# Patient Record
Sex: FEMALE | Race: BLACK OR AFRICAN AMERICAN | NOT HISPANIC OR LATINO | URBAN - METROPOLITAN AREA
[De-identification: names, ages, dates, MRNs, and addresses within clinical notes are randomized per-mention and may not be internally consistent; named-entity substitution may affect disease eponyms.]

---

## 2018-10-29 PROBLEM — Z00.00 ENCOUNTER FOR PREVENTIVE HEALTH EXAMINATION: Status: ACTIVE | Noted: 2018-10-29

## 2018-11-03 ENCOUNTER — OUTPATIENT (OUTPATIENT)
Dept: OUTPATIENT SERVICES | Facility: HOSPITAL | Age: 68
LOS: 1 days | End: 2018-11-03
Payer: MEDICARE

## 2018-11-03 ENCOUNTER — APPOINTMENT (OUTPATIENT)
Dept: NUCLEAR MEDICINE | Facility: IMAGING CENTER | Age: 68
End: 2018-11-03
Payer: MEDICARE

## 2018-11-03 DIAGNOSIS — Z00.8 ENCOUNTER FOR OTHER GENERAL EXAMINATION: ICD-10-CM

## 2018-11-03 PROCEDURE — A9500: CPT

## 2018-11-03 PROCEDURE — A9512: CPT

## 2018-11-03 PROCEDURE — 78072 PARATHYRD PLANAR W/SPECT&CT: CPT

## 2018-11-03 PROCEDURE — 78072 PARATHYRD PLANAR W/SPECT&CT: CPT | Mod: 26

## 2019-02-11 ENCOUNTER — APPOINTMENT (OUTPATIENT)
Dept: INTERNAL MEDICINE | Facility: CLINIC | Age: 69
End: 2019-02-11

## 2020-07-20 ENCOUNTER — APPOINTMENT (OUTPATIENT)
Dept: ORTHOPEDIC SURGERY | Facility: CLINIC | Age: 70
End: 2020-07-20
Payer: MEDICARE

## 2020-07-20 VITALS — BODY MASS INDEX: 30 KG/M2 | HEIGHT: 62 IN | WEIGHT: 163 LBS

## 2020-07-20 PROCEDURE — 99204 OFFICE O/P NEW MOD 45 MIN: CPT

## 2020-07-20 PROCEDURE — 73502 X-RAY EXAM HIP UNI 2-3 VIEWS: CPT | Mod: RT

## 2020-07-20 NOTE — PHYSICAL EXAM
[de-identified] : AP and lateral x-rays of the right hip, pelvis, and femur were ordered and taken in the office and demonstrate degenerative joint disease of the hip with joint space narrowing, osteophyte formation, and subchondral sclerosis.\par  [de-identified] : Patient is well nourished, well-developed, in no acute distress, with appropriate mood and affect. The patient is oriented to time, place, and person. Respirations are even and unlabored. Gait evaluation reveals a limp. There is no inguinal adenopathy. Examination of the contralateral hip shows normal range of motion, strength, no tenderness, and intact skin. The affected limb is well-perfused, shows a grossly normal motor and sensory examination. Examination of the hip shows no skin lesions. Hip motion is reduced and causes pain. FADIR is positive and ELBERT is positive. Stinchfield test is positive. Leg lengths are approximately short on the right by 1 cm. Both hips are stable and muscle strength is normal. Pedal pulses are palpable.

## 2020-07-20 NOTE — HISTORY OF PRESENT ILLNESS
[de-identified] : This is very nice 70-year-old female experiencing Right hip pain, which is severe in intensity. patient states pain is chronic for years.  The pain substantially limits activities of daily living. Walking tolerance is reduced. Patient uses tylenol for relief as needed. The patient has no tried any treatments to this point including NSAIDs, activity modification, use of an assistive device such as a cane or walker, or physical therapy. The patient does not use a neoprene sleeve knee brace. The patient denies any radiation of the pain to the feet and it is not associated with numbness, tingling, or weakness.\par

## 2020-07-20 NOTE — DISCUSSION/SUMMARY
[de-identified] : This patient is right hip osteoarthritis.  The patient is not an appropriate candidate for surgical intervention at this time. An extensive discussion was conducted on the natural history of the disease and the variety of surgical and non-surgical options available to the patient including, but not limited to non-steroidal anti-inflammatory medications, steroid injections, physical therapy, maintenance of ideal body weight, and reduction of activity.  I gave him a prescription for right hip intra-articular cortisone injection image guided.  She can take over-the-counter NSAIDs for pain.  She will do home exercise program.  The patient will schedule an appointment as needed.\par

## 2020-08-14 ENCOUNTER — APPOINTMENT (OUTPATIENT)
Dept: ORTHOPEDIC SURGERY | Facility: CLINIC | Age: 70
End: 2020-08-14

## 2020-08-24 ENCOUNTER — APPOINTMENT (OUTPATIENT)
Dept: ORTHOPEDIC SURGERY | Facility: CLINIC | Age: 70
End: 2020-08-24
Payer: MEDICARE

## 2020-08-24 VITALS — BODY MASS INDEX: 32.59 KG/M2 | HEIGHT: 60 IN | WEIGHT: 166 LBS

## 2020-08-24 PROCEDURE — 99214 OFFICE O/P EST MOD 30 MIN: CPT

## 2020-08-24 RX ORDER — AMLODIPINE BESYLATE 5 MG/1
5 TABLET ORAL
Refills: 0 | Status: ACTIVE | COMMUNITY

## 2020-08-24 NOTE — HISTORY OF PRESENT ILLNESS
[de-identified] : This is very nice 70-year-old female here for re-evaluation of Rt hip pain.  I have previously diagnosed her with severe right hip osteoarthritis.  Severe in intensity.  Patient states pain is chronic for years.  The pain substantially limits activities of daily living. Walking tolerance is reduced. Patient uses tylenol and Advil for relief as needed but they only help minimally. She does not use an assistive device.  She has not tried physical therapy but does a home exercise program.  We previously discussed a right hip intra-articular cortisone injection for pain relief but she decided against this favoring just proceeding with surgery and not deferring surgical treatment as definitive treatment.  The patient denies any radiation of the pain to the feet and it is not associated with numbness, tingling, or weakness.\par

## 2020-08-24 NOTE — DISCUSSION/SUMMARY
[de-identified] : This patient has severe right hip osteoarthritis.  She has failed a course of conservative management and would like to proceed with a direct anterior approach right total hip arthroplasty.\par \par The patient is an appropriate candidate for consideration of right total hip replacement. An extensive discussion was conducted of the natural history of the disease and the variety of surgical and non-surgical treatment options available to the patient. A risk/benefit analysis was discussed with the patient reviewing the advantages and disadvantages of surgical intervention at this time. Both the level and length of the patient's pain have made additional conservative treatment measures consisting of NSAIDs, physical therapy, and/or corticosteroids contraindicated. A full explanation was given of the nature and the purpose of the procedure and anesthesia, its benefits, possible alternative methods of diagnosis or treatment, the risks involved, the possibility of complications, the foreseeable consequences of the procedure and the possible results of the non-treatment. I reviewed the plan of care as well as used a model of a total hip implant equivalent to the one that will be used for their total hip joint replacement. The ability to secure the implant utilizing cement or cementless (press-fit) was discussed with the patient. The patient agrees with the plan of care, as well as the use of implants for their total hip replacement. \par \par No guarantee or assurance was made as to the results that may be obtained. Specifically, the risks were identified to include, but are not limited to the following: Infection, phlebitis, pulmonary embolism, death, paralysis, dislocation, pain, stiffness, instability, limp, weakness, breakage, leg-length inequality, uncontrolled bleeding, nerve injury, blood vessel injury, pressure sores, anesthetic risks, delayed healing of wound and bone, and wear and loosening. Further discussion was undertaken with the patient about the details of surgical preparation, treatment, and postoperative rehabilitation including medical clearance, autotransfusion, the hospital course, and the postoperative rehabilitation involved. All in all, I feel that this patient is a good candidate for surgical reconstruction.\par \par The patient and I discussed the current SARS-CoV-2 (COVID-19) pandemic which has affected our local hospitals. We discussed that our hospitals treat patients with COVID-19. All efforts will be made to avoid cohorting the patient with diagnosed or suspected COVID-19 patient. They also understand that we will screen them 24-48 hours prior to surgery. Despite our best efforts, there is a potential risk for iatrogenic transmission of COVID-19 to the patient during the perioperative period. Lorrie COVID-19 during the perioperative period may increase the patient´s risks of an adverse outcome including postoperative pneumonia, difficulty breathing, requirement for a breathing tube (general endotracheal intubation), and death. The patient is understanding of this risk, and is willing to proceed with surgery at this time.

## 2020-08-24 NOTE — PHYSICAL EXAM
[de-identified] : Patient is well nourished, well-developed, in no acute distress, with appropriate mood and affect. The patient is oriented to time, place, and person. Respirations are even and unlabored. Gait evaluation reveals a limp. There is no inguinal adenopathy. Examination of the contralateral hip shows normal range of motion, strength, no tenderness, and intact skin. The affected limb is well-perfused, shows a grossly normal motor and sensory examination. Examination of the hip shows no skin lesions. Hip motion is reduced and causes pain. FADIR is positive and ELBERT is positive. Stinchfield test is positive. Leg lengths are approximately short on the right by 1 cm. Both hips are stable and muscle strength is normal. Pedal pulses are palpable. [de-identified] : AP and lateral x-rays of the right hip, pelvis, and femur were reviewed from the previous visit and demonstrate severe degenerative joint disease of the hip with joint space narrowing, osteophyte formation, and subchondral sclerosis.\par

## 2020-09-25 ENCOUNTER — OUTPATIENT (OUTPATIENT)
Dept: OUTPATIENT SERVICES | Facility: HOSPITAL | Age: 70
LOS: 1 days | Discharge: HOME | End: 2020-09-25
Payer: MEDICARE

## 2020-09-25 DIAGNOSIS — M16.11 UNILATERAL PRIMARY OSTEOARTHRITIS, RIGHT HIP: ICD-10-CM

## 2020-09-25 DIAGNOSIS — Z98.891 HISTORY OF UTERINE SCAR FROM PREVIOUS SURGERY: Chronic | ICD-10-CM

## 2020-09-25 DIAGNOSIS — Z01.818 ENCOUNTER FOR OTHER PREPROCEDURAL EXAMINATION: ICD-10-CM

## 2020-09-25 DIAGNOSIS — E78.5 HYPERLIPIDEMIA, UNSPECIFIED: ICD-10-CM

## 2020-09-25 DIAGNOSIS — Z98.890 OTHER SPECIFIED POSTPROCEDURAL STATES: Chronic | ICD-10-CM

## 2020-09-25 DIAGNOSIS — I10 ESSENTIAL (PRIMARY) HYPERTENSION: ICD-10-CM

## 2020-09-25 DIAGNOSIS — C50.919 MALIGNANT NEOPLASM OF UNSPECIFIED SITE OF UNSPECIFIED FEMALE BREAST: ICD-10-CM

## 2020-09-25 DIAGNOSIS — Z86.73 PERSONAL HISTORY OF TRANSIENT ISCHEMIC ATTACK (TIA), AND CEREBRAL INFARCTION WITHOUT RESIDUAL DEFICITS: ICD-10-CM

## 2020-09-25 LAB
ANION GAP SERPL CALC-SCNC: 6 MMOL/L — SIGNIFICANT CHANGE UP (ref 5–17)
APTT BLD: 30.7 SEC — SIGNIFICANT CHANGE UP (ref 27.5–35.5)
BLD GP AB SCN SERPL QL: SIGNIFICANT CHANGE UP
BUN SERPL-MCNC: 26 MG/DL — HIGH (ref 7–23)
CALCIUM SERPL-MCNC: 9.5 MG/DL — SIGNIFICANT CHANGE UP (ref 8.5–10.1)
CHLORIDE SERPL-SCNC: 105 MMOL/L — SIGNIFICANT CHANGE UP (ref 96–108)
CO2 SERPL-SCNC: 29 MMOL/L — SIGNIFICANT CHANGE UP (ref 22–31)
CREAT SERPL-MCNC: 0.9 MG/DL — SIGNIFICANT CHANGE UP (ref 0.5–1.3)
GLUCOSE SERPL-MCNC: 80 MG/DL — SIGNIFICANT CHANGE UP (ref 70–99)
HCT VFR BLD CALC: 42.5 % — SIGNIFICANT CHANGE UP (ref 34.5–45)
HGB BLD-MCNC: 13.9 G/DL — SIGNIFICANT CHANGE UP (ref 11.5–15.5)
INR BLD: 1.05 RATIO — SIGNIFICANT CHANGE UP (ref 0.88–1.16)
MCHC RBC-ENTMCNC: 30.2 PG — SIGNIFICANT CHANGE UP (ref 27–34)
MCHC RBC-ENTMCNC: 32.7 GM/DL — SIGNIFICANT CHANGE UP (ref 32–36)
MCV RBC AUTO: 92.2 FL — SIGNIFICANT CHANGE UP (ref 80–100)
NRBC # BLD: 0 /100 WBCS — SIGNIFICANT CHANGE UP (ref 0–0)
PLATELET # BLD AUTO: 268 K/UL — SIGNIFICANT CHANGE UP (ref 150–400)
POTASSIUM SERPL-MCNC: 3.5 MMOL/L — SIGNIFICANT CHANGE UP (ref 3.5–5.3)
POTASSIUM SERPL-SCNC: 3.5 MMOL/L — SIGNIFICANT CHANGE UP (ref 3.5–5.3)
PROTHROM AB SERPL-ACNC: 12.2 SEC — SIGNIFICANT CHANGE UP (ref 10.6–13.6)
RBC # BLD: 4.61 M/UL — SIGNIFICANT CHANGE UP (ref 3.8–5.2)
RBC # FLD: 13.2 % — SIGNIFICANT CHANGE UP (ref 10.3–14.5)
SODIUM SERPL-SCNC: 140 MMOL/L — SIGNIFICANT CHANGE UP (ref 135–145)
WBC # BLD: 4.14 K/UL — SIGNIFICANT CHANGE UP (ref 3.8–10.5)
WBC # FLD AUTO: 4.14 K/UL — SIGNIFICANT CHANGE UP (ref 3.8–10.5)

## 2020-09-25 PROCEDURE — 93010 ELECTROCARDIOGRAM REPORT: CPT

## 2020-09-25 RX ORDER — SODIUM CHLORIDE 9 MG/ML
3 INJECTION INTRAMUSCULAR; INTRAVENOUS; SUBCUTANEOUS EVERY 8 HOURS
Refills: 0 | Status: DISCONTINUED | OUTPATIENT
Start: 2020-10-09 | End: 2020-10-11

## 2020-09-25 NOTE — H&P PST ADULT - HISTORY OF PRESENT ILLNESS
70F pmh htn, hld, gerd, osteopenia breast cancer (treated with Radiation)----- c/o right hip pain 2/2 unilateral primary osteoarthritis here for PST for scheduled Right total hip arthroplasty  Looking forward to ---  This patient denies any fever, cough, sob, flu like symptoms or travel outside of the [place] in the past 30 days 70F pmh htn, hld, gerd, osteopenia breast cancer (treated with Radiation)----- c/o right hip pain 2/2 unilateral primary osteoarthritis here for PST for scheduled Right total hip arthroplasty  Looking forward playing with grandchildren   This patient denies any fever, cough, sob, flu like symptoms or travel outside of the [place] in the past 30 days 70F pmh htn, hld, gerd, osteopenia breast cancer (treated with surgery and Radiation), tia,  c/o right hip pain 2/2 unilateral primary osteoarthritis here for PST for scheduled Right total hip arthroplasty  Looking forward playing with grandchildren   This patient denies any fever, cough, sob, flu like symptoms or travel outside of the [place] in the past 30 days

## 2020-09-25 NOTE — H&P PST ADULT - NSICDXPASTSURGICALHX_GEN_ALL_CORE_FT
PAST SURGICAL HISTORY:  H/O:      S/P bilateral breast reduction     S/P lumpectomy, left breast b/l

## 2020-09-25 NOTE — H&P PST ADULT - NSICDXPROBLEM_GEN_ALL_CORE_FT
PROBLEM DIAGNOSES  Problem: Unilateral primary osteoarthritis, right hip  Assessment and Plan: Right total hip arthroplasty direct anterior approach  labs - cbc,pt/ptt,bmp,t&s,nose cx,ekg  M/C required  Dental clearance required  preop 3 day hibiclens instruction reviewed and given .instructed on if  nose cx positive use mupuricin 5 days and checklist given  take routine meds DOS with sips of water. avoid NSAID and OTC supplements. verbalized understanding  information on proper nutrition , increase protein and better food choices provided in packet   Ensure clear given  Patient to scheduled COVID swab appointment on 10-6-2020    Problem: Breast cancer  Assessment and Plan: treated with surgery and Radiation    Problem: HTN (hypertension)  Assessment and Plan: Continue current regimen and medications.     Problem: HLD (hyperlipidemia)  Assessment and Plan: Continue current regimen and medications.     Problem: History of TIAs  Assessment and Plan: Continue current regimen

## 2020-09-25 NOTE — PHYSICAL THERAPY INITIAL EVALUATION ADULT - PERTINENT HX OF CURRENT PROBLEM, REHAB EVAL
Patient attends pre-op testing today following consult c Dr. Toscano due to chronic pain to right hip. Elective R ANTHONY is now scheduled in this facility for 10/9/2020.

## 2020-09-25 NOTE — H&P PST ADULT - ASSESSMENT
CAPRINI SCORE    AGE RELATED RISK FACTORS                                                       MOBILITY RELATED FACTORS  [ ] Age 41-60 years                                            (1 Point)                  [ ] Bed rest                                                        (1 Point)  [ ] Age: 61-74 years                                           (2 Points)                [ ] Plaster cast                                                   (2 Points)  [ ] Age= 75 years                                              (3 Points)                 [ ] Bed bound for more than 72 hours                   (2 Points)    DISEASE RELATED RISK FACTORS                                               GENDER SPECIFIC FACTORS  [ ] Edema in the lower extremities                       (1 Point)                  [ ] Pregnancy                                                     (1 Point)  [ ] Varicose veins                                               (1 Point)                  [ ] Post-partum < 6 weeks                                   (1 Point)             [ ] BMI > 25 Kg/m2                                            (1 Point)                  [ ] Hormonal therapy  or oral contraception            (1 Point)                 [ ] Sepsis (in the previous month)                        (1 Point)                  [ ] History of pregnancy complications  [ ] Pneumonia or serious lung disease                                               [ ] Unexplained or recurrent                       (1 Point)           (in the previous month)                               (1 Point)  [ ] Abnormal pulmonary function test                     (1 Point)                 SURGERY RELATED RISK FACTORS  [ ] Acute myocardial infarction                              (1 Point)                 [ ]  Section                                            (1 Point)  [ ] Congestive heart failure (in the previous month)  (1 Point)                 [ ] Minor surgery                                                 (1 Point)   [ ] Inflammatory bowel disease                             (1 Point)                 [ ] Arthroscopic surgery                                        (2 Points)  [ ] Central venous access                                    (2 Points)                [ ] General surgery lasting more than 45 minutes   (2 Points)       [ ] Stroke (in the previous month)                          (5 Points)               [ ] Elective arthroplasty                                        (5 Points)                                                                                                                                               HEMATOLOGY RELATED FACTORS                                                 TRAUMA RELATED RISK FACTORS  [ ] Prior episodes of VTE                                     (3 Points)                 [ ] Fracture of the hip, pelvis, or leg                       (5 Points)  [ ] Positive family history for VTE                         (3 Points)                 [ ] Acute spinal cord injury (in the previous month)  (5 Points)  [ ] Prothrombin 71537 A                                      (3 Points)                 [ ] Paralysis  (less than 1 month)                          (5 Points)  [ ] Factor V Leiden                                             (3 Points)                 [ ] Multiple Trauma within 1 month                         (5 Points)  [ ] Lupus anticoagulants                                     (3 Points)                                                           [ ] Anticardiolipin antibodies                                (3 Points)                                                       [ ] High homocysteine in the blood                      (3 Points)                                             [ ] Other congenital or acquired thrombophilia       (3 Points)                                                [ ] Heparin induced thrombocytopenia                  (3 Points)                                          Total Score [          ] 70F pmh htn, hld, gerd, osteopenia breast cancer (treated with surgery and Radiation), tia,  c/o right hip pain 2/2 unilateral primary osteoarthritis here for PST for scheduled Right total hip arthroplasty  CAPRINI SCORE    AGE RELATED RISK FACTORS                                                       MOBILITY RELATED FACTORS  [ ] Age 41-60 years                                            (1 Point)                  [ ] Bed rest                                                        (1 Point)  [ x] Age: 61-74 years                                           (2 Points)                [ ] Plaster cast                                                   (2 Points)  [ ] Age= 75 years                                              (3 Points)                 [ ] Bed bound for more than 72 hours                   (2 Points)    DISEASE RELATED RISK FACTORS                                               GENDER SPECIFIC FACTORS  [x ] Edema in the lower extremities                       (1 Point)                  [ ] Pregnancy                                                     (1 Point)  [ ] Varicose veins                                               (1 Point)                  [ ] Post-partum < 6 weeks                                   (1 Point)             [x ] BMI > 25 Kg/m2                                            (1 Point)                  [ ] Hormonal therapy  or oral contraception            (1 Point)                 [ ] Sepsis (in the previous month)                        (1 Point)                  [ ] History of pregnancy complications  [ ] Pneumonia or serious lung disease                                               [ ] Unexplained or recurrent                       (1 Point)           (in the previous month)                               (1 Point)  [ ] Abnormal pulmonary function test                     (1 Point)                 SURGERY RELATED RISK FACTORS  [ ] Acute myocardial infarction                              (1 Point)                 [ ]  Section                                            (1 Point)  [ ] Congestive heart failure (in the previous month)  (1 Point)                 [ ] Minor surgery                                                 (1 Point)   [ ] Inflammatory bowel disease                             (1 Point)                 [ ] Arthroscopic surgery                                        (2 Points)  [ ] Central venous access                                    (2 Points)                [ ] General surgery lasting more than 45 minutes   (2 Points)       [ ] Stroke (in the previous month)                          (5 Points)               [ x] Elective arthroplasty                                        (5 Points)                                                                                                                                               HEMATOLOGY RELATED FACTORS                                                 TRAUMA RELATED RISK FACTORS  [ ] Prior episodes of VTE                                     (3 Points)                 [ ] Fracture of the hip, pelvis, or leg                       (5 Points)  [ ] Positive family history for VTE                         (3 Points)                 [ ] Acute spinal cord injury (in the previous month)  (5 Points)  [ ] Prothrombin 21898 A                                      (3 Points)                 [ ] Paralysis  (less than 1 month)                          (5 Points)  [ ] Factor V Leiden                                             (3 Points)                 [ ] Multiple Trauma within 1 month                         (5 Points)  [ ] Lupus anticoagulants                                     (3 Points)                                                           [ ] Anticardiolipin antibodies                                (3 Points)                                                       [ ] High homocysteine in the blood                      (3 Points)                                             [ ] Other congenital or acquired thrombophilia       (3 Points)                                                [ ] Heparin induced thrombocytopenia                  (3 Points)                                          Total Score [       9   ]

## 2020-09-25 NOTE — PHYSICAL THERAPY INITIAL EVALUATION ADULT - ADDITIONAL COMMENTS
Pt lives with her  (whom can provide assist upon D/C home) in a private home, 2 entry steps (no rail), 1 level inside home.  Pt has a walk-in shower stall with a fixed shower head, standard toilet seat eight, & no grab bar. Pt states she is currently independent with all functional mobility including community ambulation without device. Pt states she is independent with ADL's as well. Pt reports daily 7/10 pain & states it is worse with any activity. Pt is right hand dominant, wears eye glasses, drives, & is retired.  Pt endorses taking narcotics for pain management. Goal of therapy: manage pain & improve functional mobility.

## 2020-09-25 NOTE — OCCUPATIONAL THERAPY INITIAL EVALUATION ADULT - ADDITIONAL COMMENTS
Patient lives with  (Who can assist and also patients sister) in a private house with 2 steps and no rails. Once inside, the patient main bedroom and bathroom is on that floor when entering. The bathroom has a walk in shower, fixed shower head, standard toilet seat and no grab bars. The patient owns a shower bench. The patient reports that a 3/1 commode can fit over the toilet at home. The patient ambulates with no device and does not own any device for ambulation. The patient daily pain is a 7 at rest and a 8-10 with movement. The patient manages the pain with rest and with Tylenol. The patient has no recent outpatient PT, no recent falls, no buckling of the knees reported. The patient wears eye glasses for reading, R handed, drives and has no hearing impairments.

## 2020-09-25 NOTE — PHYSICAL THERAPY INITIAL EVALUATION ADULT - RANGE OF MOTION EXAMINATION, REHAB EVAL
except right hip limited due to pain./bilateral lower extremity was ROM was WNL (within normal limits)/bilateral upper extremity ROM was WNL (within normal limits)/deficits as listed below

## 2020-09-25 NOTE — H&P PST ADULT - NSICDXPASTMEDICALHX_GEN_ALL_CORE_FT
PAST MEDICAL HISTORY:  Breast cancer, left treated with Radiation 2015    GERD (gastroesophageal reflux disease)     H/O osteopenia     HLD (hyperlipidemia)     HTN (hypertension)      PAST MEDICAL HISTORY:  Breast cancer, left treated with Radiation 2015    GERD (gastroesophageal reflux disease)     H/O osteopenia     HLD (hyperlipidemia)     HTN (hypertension)     TIA (transient ischemic attack)

## 2020-09-26 LAB
A1C WITH ESTIMATED AVERAGE GLUCOSE RESULT: 5.4 % — SIGNIFICANT CHANGE UP (ref 4–5.6)
ESTIMATED AVERAGE GLUCOSE: 108 MG/DL — SIGNIFICANT CHANGE UP (ref 68–114)
MRSA PCR RESULT.: SIGNIFICANT CHANGE UP
S AUREUS DNA NOSE QL NAA+PROBE: SIGNIFICANT CHANGE UP

## 2020-09-29 PROBLEM — I10 ESSENTIAL (PRIMARY) HYPERTENSION: Chronic | Status: ACTIVE | Noted: 2020-09-25

## 2020-09-29 PROBLEM — K21.9 GASTRO-ESOPHAGEAL REFLUX DISEASE WITHOUT ESOPHAGITIS: Chronic | Status: ACTIVE | Noted: 2020-09-25

## 2020-09-29 PROBLEM — Z87.39 PERSONAL HISTORY OF OTHER DISEASES OF THE MUSCULOSKELETAL SYSTEM AND CONNECTIVE TISSUE: Chronic | Status: ACTIVE | Noted: 2020-09-25

## 2020-09-29 PROBLEM — C50.912 MALIGNANT NEOPLASM OF UNSPECIFIED SITE OF LEFT FEMALE BREAST: Chronic | Status: ACTIVE | Noted: 2020-09-25

## 2020-09-29 PROBLEM — E78.5 HYPERLIPIDEMIA, UNSPECIFIED: Chronic | Status: ACTIVE | Noted: 2020-09-25

## 2020-10-06 ENCOUNTER — APPOINTMENT (OUTPATIENT)
Dept: DISASTER EMERGENCY | Facility: CLINIC | Age: 70
End: 2020-10-06

## 2020-10-07 LAB — SARS-COV-2 N GENE NPH QL NAA+PROBE: NOT DETECTED

## 2020-10-08 ENCOUNTER — TRANSCRIPTION ENCOUNTER (OUTPATIENT)
Age: 70
End: 2020-10-08

## 2020-10-09 ENCOUNTER — INPATIENT (INPATIENT)
Facility: HOSPITAL | Age: 70
LOS: 1 days | Discharge: HOME HEALTH | End: 2020-10-11
Attending: ORTHOPAEDIC SURGERY | Admitting: ORTHOPAEDIC SURGERY
Payer: MEDICARE

## 2020-10-09 ENCOUNTER — RESULT REVIEW (OUTPATIENT)
Age: 70
End: 2020-10-09

## 2020-10-09 ENCOUNTER — TRANSCRIPTION ENCOUNTER (OUTPATIENT)
Age: 70
End: 2020-10-09

## 2020-10-09 ENCOUNTER — APPOINTMENT (OUTPATIENT)
Dept: ORTHOPEDIC SURGERY | Facility: HOSPITAL | Age: 70
End: 2020-10-09

## 2020-10-09 VITALS
WEIGHT: 160.06 LBS | DIASTOLIC BLOOD PRESSURE: 61 MMHG | HEIGHT: 60 IN | SYSTOLIC BLOOD PRESSURE: 124 MMHG | OXYGEN SATURATION: 99 % | TEMPERATURE: 98 F | HEART RATE: 57 BPM | RESPIRATION RATE: 18 BRPM

## 2020-10-09 DIAGNOSIS — Z98.890 OTHER SPECIFIED POSTPROCEDURAL STATES: Chronic | ICD-10-CM

## 2020-10-09 DIAGNOSIS — Z98.891 HISTORY OF UTERINE SCAR FROM PREVIOUS SURGERY: Chronic | ICD-10-CM

## 2020-10-09 LAB — BLD GP AB SCN SERPL QL: SIGNIFICANT CHANGE UP

## 2020-10-09 PROCEDURE — 73502 X-RAY EXAM HIP UNI 2-3 VIEWS: CPT | Mod: 26

## 2020-10-09 PROCEDURE — 27130 TOTAL HIP ARTHROPLASTY: CPT | Mod: RT

## 2020-10-09 PROCEDURE — 88305 TISSUE EXAM BY PATHOLOGIST: CPT | Mod: 26

## 2020-10-09 PROCEDURE — 88311 DECALCIFY TISSUE: CPT | Mod: 26

## 2020-10-09 RX ORDER — ONDANSETRON 8 MG/1
4 TABLET, FILM COATED ORAL EVERY 6 HOURS
Refills: 0 | Status: DISCONTINUED | OUTPATIENT
Start: 2020-10-09 | End: 2020-10-11

## 2020-10-09 RX ORDER — ACETAMINOPHEN 500 MG
1000 TABLET ORAL ONCE
Refills: 0 | Status: COMPLETED | OUTPATIENT
Start: 2020-10-09 | End: 2020-10-09

## 2020-10-09 RX ORDER — PANTOPRAZOLE SODIUM 20 MG/1
1 TABLET, DELAYED RELEASE ORAL
Qty: 14 | Refills: 0
Start: 2020-10-09 | End: 2020-10-22

## 2020-10-09 RX ORDER — ASPIRIN/CALCIUM CARB/MAGNESIUM 324 MG
81 TABLET ORAL
Refills: 0 | Status: DISCONTINUED | OUTPATIENT
Start: 2020-10-09 | End: 2020-10-11

## 2020-10-09 RX ORDER — PANTOPRAZOLE SODIUM 20 MG/1
40 TABLET, DELAYED RELEASE ORAL
Refills: 0 | Status: DISCONTINUED | OUTPATIENT
Start: 2020-10-09 | End: 2020-10-11

## 2020-10-09 RX ORDER — SENNA PLUS 8.6 MG/1
2 TABLET ORAL AT BEDTIME
Refills: 0 | Status: DISCONTINUED | OUTPATIENT
Start: 2020-10-09 | End: 2020-10-11

## 2020-10-09 RX ORDER — SODIUM CHLORIDE 9 MG/ML
500 INJECTION, SOLUTION INTRAVENOUS ONCE
Refills: 0 | Status: COMPLETED | OUTPATIENT
Start: 2020-10-09 | End: 2020-10-10

## 2020-10-09 RX ORDER — ASPIRIN/CALCIUM CARB/MAGNESIUM 324 MG
1 TABLET ORAL
Qty: 60 | Refills: 0
Start: 2020-10-09 | End: 2020-11-07

## 2020-10-09 RX ORDER — SODIUM CHLORIDE 9 MG/ML
1000 INJECTION, SOLUTION INTRAVENOUS
Refills: 0 | Status: DISCONTINUED | OUTPATIENT
Start: 2020-10-09 | End: 2020-10-09

## 2020-10-09 RX ORDER — CELECOXIB 200 MG/1
200 CAPSULE ORAL
Refills: 0 | Status: DISCONTINUED | OUTPATIENT
Start: 2020-10-09 | End: 2020-10-11

## 2020-10-09 RX ORDER — HYDROMORPHONE HYDROCHLORIDE 2 MG/ML
0.5 INJECTION INTRAMUSCULAR; INTRAVENOUS; SUBCUTANEOUS
Refills: 0 | Status: DISCONTINUED | OUTPATIENT
Start: 2020-10-09 | End: 2020-10-09

## 2020-10-09 RX ORDER — AMLODIPINE BESYLATE 2.5 MG/1
5 TABLET ORAL DAILY
Refills: 0 | Status: DISCONTINUED | OUTPATIENT
Start: 2020-10-09 | End: 2020-10-11

## 2020-10-09 RX ORDER — ACETAMINOPHEN 500 MG
975 TABLET ORAL EVERY 6 HOURS
Refills: 0 | Status: DISCONTINUED | OUTPATIENT
Start: 2020-10-10 | End: 2020-10-11

## 2020-10-09 RX ORDER — OXYCODONE HYDROCHLORIDE 5 MG/1
10 TABLET ORAL EVERY 4 HOURS
Refills: 0 | Status: DISCONTINUED | OUTPATIENT
Start: 2020-10-09 | End: 2020-10-11

## 2020-10-09 RX ORDER — SODIUM CHLORIDE 9 MG/ML
500 INJECTION, SOLUTION INTRAVENOUS ONCE
Refills: 0 | Status: COMPLETED | OUTPATIENT
Start: 2020-10-09 | End: 2020-10-09

## 2020-10-09 RX ORDER — ACETAMINOPHEN 500 MG
650 TABLET ORAL ONCE
Refills: 0 | Status: COMPLETED | OUTPATIENT
Start: 2020-10-09 | End: 2020-10-09

## 2020-10-09 RX ORDER — TRAMADOL HYDROCHLORIDE 50 MG/1
1 TABLET ORAL
Qty: 28 | Refills: 0
Start: 2020-10-09 | End: 2020-10-15

## 2020-10-09 RX ORDER — INFLUENZA VIRUS VACCINE 15; 15; 15; 15 UG/.5ML; UG/.5ML; UG/.5ML; UG/.5ML
0.5 SUSPENSION INTRAMUSCULAR ONCE
Refills: 0 | Status: COMPLETED | OUTPATIENT
Start: 2020-10-09 | End: 2020-10-09

## 2020-10-09 RX ORDER — OXYCODONE HYDROCHLORIDE 5 MG/1
5 TABLET ORAL EVERY 4 HOURS
Refills: 0 | Status: DISCONTINUED | OUTPATIENT
Start: 2020-10-09 | End: 2020-10-11

## 2020-10-09 RX ORDER — KETOROLAC TROMETHAMINE 30 MG/ML
30 SYRINGE (ML) INJECTION EVERY 6 HOURS
Refills: 0 | Status: COMPLETED | OUTPATIENT
Start: 2020-10-09 | End: 2020-10-11

## 2020-10-09 RX ORDER — POLYETHYLENE GLYCOL 3350 17 G/17G
17 POWDER, FOR SOLUTION ORAL DAILY
Refills: 0 | Status: DISCONTINUED | OUTPATIENT
Start: 2020-10-09 | End: 2020-10-11

## 2020-10-09 RX ORDER — ZOLPIDEM TARTRATE 10 MG/1
5 TABLET ORAL AT BEDTIME
Refills: 0 | Status: DISCONTINUED | OUTPATIENT
Start: 2020-10-09 | End: 2020-10-11

## 2020-10-09 RX ORDER — ACETAMINOPHEN 500 MG
2 TABLET ORAL
Qty: 30 | Refills: 0
Start: 2020-10-09 | End: 2020-10-13

## 2020-10-09 RX ORDER — CELECOXIB 200 MG/1
200 CAPSULE ORAL ONCE
Refills: 0 | Status: COMPLETED | OUTPATIENT
Start: 2020-10-09 | End: 2020-10-09

## 2020-10-09 RX ORDER — OXYCODONE HYDROCHLORIDE 5 MG/1
1 TABLET ORAL
Qty: 42 | Refills: 0
Start: 2020-10-09 | End: 2020-10-15

## 2020-10-09 RX ORDER — TRAMADOL HYDROCHLORIDE 50 MG/1
50 TABLET ORAL EVERY 6 HOURS
Refills: 0 | Status: DISCONTINUED | OUTPATIENT
Start: 2020-10-09 | End: 2020-10-11

## 2020-10-09 RX ORDER — DOCUSATE SODIUM 100 MG
1 CAPSULE ORAL
Qty: 60 | Refills: 0
Start: 2020-10-09 | End: 2020-11-07

## 2020-10-09 RX ORDER — CEFAZOLIN SODIUM 1 G
2000 VIAL (EA) INJECTION EVERY 8 HOURS
Refills: 0 | Status: COMPLETED | OUTPATIENT
Start: 2020-10-09 | End: 2020-10-10

## 2020-10-09 RX ORDER — DEXAMETHASONE 0.5 MG/5ML
6 ELIXIR ORAL ONCE
Refills: 0 | Status: COMPLETED | OUTPATIENT
Start: 2020-10-10 | End: 2020-10-10

## 2020-10-09 RX ORDER — ACETAMINOPHEN 500 MG
1000 TABLET ORAL ONCE
Refills: 0 | Status: DISCONTINUED | OUTPATIENT
Start: 2020-10-09 | End: 2020-10-11

## 2020-10-09 RX ADMIN — SODIUM CHLORIDE 500 MILLILITER(S): 9 INJECTION, SOLUTION INTRAVENOUS at 16:22

## 2020-10-09 RX ADMIN — OXYCODONE HYDROCHLORIDE 10 MILLIGRAM(S): 5 TABLET ORAL at 20:41

## 2020-10-09 RX ADMIN — Medication 100 MILLIGRAM(S): at 21:35

## 2020-10-09 RX ADMIN — Medication 1000 MILLIGRAM(S): at 23:30

## 2020-10-09 RX ADMIN — Medication 400 MILLIGRAM(S): at 16:23

## 2020-10-09 RX ADMIN — Medication 30 MILLIGRAM(S): at 23:14

## 2020-10-09 RX ADMIN — Medication 650 MILLIGRAM(S): at 12:22

## 2020-10-09 RX ADMIN — Medication 1000 MILLIGRAM(S): at 16:30

## 2020-10-09 RX ADMIN — SODIUM CHLORIDE 3 MILLILITER(S): 9 INJECTION INTRAMUSCULAR; INTRAVENOUS; SUBCUTANEOUS at 12:08

## 2020-10-09 RX ADMIN — Medication 400 MILLIGRAM(S): at 23:14

## 2020-10-09 RX ADMIN — Medication 81 MILLIGRAM(S): at 19:41

## 2020-10-09 RX ADMIN — SODIUM CHLORIDE 3 MILLILITER(S): 9 INJECTION INTRAMUSCULAR; INTRAVENOUS; SUBCUTANEOUS at 21:19

## 2020-10-09 RX ADMIN — SENNA PLUS 2 TABLET(S): 8.6 TABLET ORAL at 21:35

## 2020-10-09 RX ADMIN — SODIUM CHLORIDE 75 MILLILITER(S): 9 INJECTION, SOLUTION INTRAVENOUS at 17:03

## 2020-10-09 RX ADMIN — CELECOXIB 200 MILLIGRAM(S): 200 CAPSULE ORAL at 12:22

## 2020-10-09 RX ADMIN — Medication 30 MILLILITER(S): at 19:41

## 2020-10-09 RX ADMIN — ZOLPIDEM TARTRATE 5 MILLIGRAM(S): 10 TABLET ORAL at 23:14

## 2020-10-09 RX ADMIN — Medication 30 MILLIGRAM(S): at 23:30

## 2020-10-09 RX ADMIN — OXYCODONE HYDROCHLORIDE 10 MILLIGRAM(S): 5 TABLET ORAL at 19:41

## 2020-10-09 RX ADMIN — SODIUM CHLORIDE 500 MILLILITER(S): 9 INJECTION, SOLUTION INTRAVENOUS at 19:07

## 2020-10-09 NOTE — DISCHARGE NOTE PROVIDER - CARE PROVIDER_API CALL
Cole Toscano)  Orthopedics  [address], [address]  [place] [address]  Phone: [phone]  Fax: [phone]  Follow Up Time:

## 2020-10-09 NOTE — ASU PREOP CHECKLIST - HEART RATE (BEATS/MIN)
Subjective   Gemma Coronel is a 2 y.o. female.       History of Present Illness     Child is status post bilateral tube insertion.  Seems to be hearing better.  Mother states she is getting ready to start speech therapy.  No otorrhea    The following portions of the patient's history were reviewed and updated as appropriate: allergies, current medications, past family history, past medical history, past social history, past surgical history and problem list.      Review of Systems        Objective   Physical Exam  Ears: Tubes in place and patent bilaterally with no discharge    Audiogram is obtained and reviewed and shows normal hearing in sound field with tympanograms consistent with patent tubes      Assessment/Plan   Gemma was seen today for post-op.    Diagnoses and all orders for this visit:    Aftercare following surgery of a sense organ      Plan: Return in 4 months call for problems   57

## 2020-10-09 NOTE — DISCHARGE NOTE PROVIDER - NSDCCPCAREPLAN_GEN_ALL_CORE_FT
PRINCIPAL DISCHARGE DIAGNOSIS  Diagnosis: Arthritis of right hip  Assessment and Plan of Treatment:

## 2020-10-09 NOTE — DISCHARGE NOTE PROVIDER - NSTOBACCOUSAGEY/N_GEN_A_CS
[FreeTextEntry1] : Ucx reviewed will treat with augmentin x 7 days\par patient and son notified as well has yocasta on call to [facility] medicine No

## 2020-10-09 NOTE — PROGRESS NOTE ADULT - SUBJECTIVE AND OBJECTIVE BOX
Ortho Postoperative Note    Patient seen and examined at bedside, resting comfortably. Pain well controlled. Patient still states lower extremity numbness and unable to move right lower extremity due to spinal anesthesia Patient tolerated procedure well    VITAL SIGNS  T(C): 35.9 (10-09-20 @ 16:45), Max: 36.9 (10-09-20 @ 11:56)  HR: 59 (10-09-20 @ 17:00) (52 - 59)  BP: 116/61 (10-09-20 @ 17:00) (107/65 - 134/65)  RR: 12 (10-09-20 @ 17:00) (12 - 18)  SpO2: 96% (10-09-20 @ 17:00) (96% - 100%)    LABS:                  PHYSICAL EXAM  GEN: NAD    RLE:  Skin: Dressing clean/dry/intact  Compartments soft and compressible  Calves nontender  SCDs in place  Unable to assess motor and sensation due to spinal anesthesia effect  + DP pulses      Assessment:  70y Female s/p TKA POD #0    -Pain control  -DVT ppx, Aspirin 81 mg BID  -WBAT/OOB with assistance as needed  -PT/OT  -Ice as needed  -Encourage incentive spirometry  -DC planning  -Will discuss with attending and will advise if plan changes.

## 2020-10-09 NOTE — ASU PREOP CHECKLIST - WAS PATIENT ON BETA BLOCKER?
Basaglar is ok.        I filled the medication(s) and e-mailed the rx to the pharmacy([facility]).   However, the computer indicates that Basaglar is not on his formulary and is not covered.              Please let the patient know.     
Called patient with message below. He will not refill until next year. Formulary change was effective 2018. He agreed to call pharmacy for refill before he runs out in case Rx is rejected by insurance.  
Patient was informed his insurance will no longer cover Lantus next year. Formulary alternative is Basaglar. Patient has medication now but would need Rx for Basaglar for future refills. He would like if provider is ok with medication and if Rx could be sent. Please advice.  
Reason for Call:  Other call back  Detailed comments: patient has question on medication  Phone Number Patient can be reached at: Other phone number:  [phone]  Best Time: any  Can we leave a detailed message on this number? YES  Call taken on 11/10/2017 at 11:46 AM by RAFAL URBAN    
No

## 2020-10-09 NOTE — DISCHARGE NOTE PROVIDER - NSDCMRMEDTOKEN_GEN_ALL_CORE_FT
Ambien 5 mg oral tablet: 1 tab(s) orally once a day (at bedtime)  amLODIPine 5 mg oral tablet: 1 tab(s) orally once a day   acetaminophen 500 mg oral tablet: 2 tab(s) orally every 8 hours MDD:8 tabs  Ambien 5 mg oral tablet: 1 tab(s) orally once a day (at bedtime)  amLODIPine 5 mg oral tablet: 1 tab(s) orally once a day  aspirin 81 mg oral delayed release tablet: 1 tab(s) orally 2 times a day   Colace 100 mg oral capsule: 1 cap(s) orally 2 times a day   EC-Naprosyn 500 mg oral delayed release tablet: 1 tab(s) orally once a day MDD:1  oxyCODONE 5 mg oral tablet: 1 tab(s) orally every 4 hours, As Needed -for severe pain MDD:8  Protonix 40 mg oral delayed release tablet: 1 tab(s) orally once a day   traMADol 50 mg oral tablet: 1 tab(s) orally every 6 hours MDD:4

## 2020-10-09 NOTE — DISCHARGE NOTE PROVIDER - NSDCFUADDINST_GEN_ALL_CORE_FT
1.	Pain Control  2.	Walking with full weight bearing as tolerated, with assistive devices (walker/Cane as Needed)  3.	DVT Prophylaxis- Aspirin 81mg PO BID x 30 days  4.	Tylenol, oxyIR, tramadol as needed for pain.  5.	Follow up with Dr. Toscano as Outpatient in 30 Days after Discharge from the Hospital. Call Office For Appointment.   6.	Follow exercise program given to you by Dr. Toscano.   7.	Ice affected area as Needed  8.	Keep Dressing Clean and dry. Do not remove until POD #7.    1.	Pain Control  2.	Walking with full weight bearing as tolerated, with assistive devices (walker/Cane as Needed)  3.	DVT Prophylaxis- Aspirin 81mg PO BID x 30 days  4.	Tylenol, oxyIR, tramadol as needed for pain.  5.	Follow up with Dr. Toscano as Outpatient in 30 Days after Discharge from the Hospital. Call Office For Appointment.   6.	Follow exercise program given to you by Dr. Toscano.   7.	Ice affected area as Needed  8.	Keep Dressing Clean and dry. Do not remove until POD #10.

## 2020-10-09 NOTE — DISCHARGE NOTE PROVIDER - HOSPITAL COURSE
H&P:  Pt is a 70y Female  PAST MEDICAL & SURGICAL HISTORY:  TIA (transient ischemic attack)    H/O osteopenia    Breast cancer, left  treated with Radiation     GERD (gastroesophageal reflux disease)    HLD (hyperlipidemia)    HTN (hypertension)    H/O:     S/P bilateral breast reduction    S/P lumpectomy, left breast  b/l         Now s/p Right Total Hip Arthroplasty. Pt is afebrile with stable vital signs. Pain is controlled. Alert and Oriented. Exam reveals intact EHL FHL TA GS, +DP. Dressing is clean and dry with a New Aquacel bandage on.    Hospital Course:  Patient presented to [facility] medically cleared for elective Hip Replacement Surgery, having failed outpatient conservative management. Prophylactic antibiotics were started before the procedure and continued for 24 hours. They were admitted after surgery to the orthopedic floor.   There were no complications during the hospital stay. All home medications were continued.     Routine consults were obtained from Physical Therapy for PT. Patient was placed on anticoagulation with Aspirin 81 mg.  Pertinent home medications were continued.  Daily labs were followed.      POD 0 pt was stable overnight.  Pt received PT twice daily.  The orthopedic Attending is aware and agrees. H&P:  Pt is a 70y Female  PAST MEDICAL & SURGICAL HISTORY:  TIA (transient ischemic attack)    H/O osteopenia    Breast cancer, left  treated with Radiation     GERD (gastroesophageal reflux disease)    HLD (hyperlipidemia)    HTN (hypertension)    H/O:     S/P bilateral breast reduction     S/P lumpectomy, left breast  b/l         Now s/p Right Total Hip Arthroplasty. Pt is afebrile with stable vital signs. Pain is controlled. Alert and Oriented. Exam reveals intact EHL FHL TA GS, +DP. Dressing is clean and dry with a New Aquacel bandage on.    Hospital Course:  Patient presented to [facility] medically cleared for elective Hip Replacement Surgery, having failed outpatient conservative management. Prophylactic antibiotics were started before the procedure and continued for 24 hours. They were admitted after surgery to the orthopedic floor.   There were no complications during the hospital stay. All home medications were continued.     Routine consults were obtained from Physical Therapy for PT. Patient was placed on anticoagulation with Aspirin 81 mg.  Pertinent home medications were continued.  Daily labs were followed.      POD 0 pt was stable overnight.  Pt received PT twice daily.  The orthopedic Attending is aware and agrees. H&P:  Pt is a 70y Female  PAST MEDICAL & SURGICAL HISTORY:  TIA (transient ischemic attack)    H/O osteopenia    Breast cancer, left  treated with Radiation     GERD (gastroesophageal reflux disease)    HLD (hyperlipidemia)    HTN (hypertension)    H/O:     S/P bilateral breast reduction     S/P lumpectomy, left breast  b/l         Now s/p Right Total Hip Arthroplasty. Pt is afebrile with stable vital signs. Pain is controlled. Alert and Oriented. Exam reveals intact EHL FHL TA GS, +DP. Dressing is clean and dry with a Aquacel bandage on.    Hospital Course:  Patient presented to [facility] medically cleared for elective Hip Replacement Surgery, having failed outpatient conservative management. Prophylactic antibiotics were started before the procedure and continued for 24 hours. They were admitted after surgery to the orthopedic floor.   There were no complications during the hospital stay. All home medications were continued.     Routine consults were obtained from Physical Therapy for PT. Patient was placed on anticoagulation with Aspirin 81 mg.  Pertinent home medications were continued.  Daily labs were followed.      POD 0 pt was stable overnight.  Pt received PT twice daily.  The orthopedic Attending is aware and agrees.

## 2020-10-09 NOTE — PROGRESS NOTE ADULT - ATTENDING COMMENTS
I agree with the above note and have personally seen and examined this patient. All pertinent films have been reviewed. Please refer to clinical documentation of the history, physical examinations, data summary, and both assessment and plan as documented above and with which I agree.    Cole Toscnao MD  Attending Orthopedic Surgeon

## 2020-10-10 ENCOUNTER — TRANSCRIPTION ENCOUNTER (OUTPATIENT)
Age: 70
End: 2020-10-10

## 2020-10-10 LAB
ANION GAP SERPL CALC-SCNC: 7 MMOL/L — SIGNIFICANT CHANGE UP (ref 5–17)
BLD GP AB SCN SERPL QL: SIGNIFICANT CHANGE UP
BUN SERPL-MCNC: 15 MG/DL — SIGNIFICANT CHANGE UP (ref 7–23)
CALCIUM SERPL-MCNC: 8.5 MG/DL — SIGNIFICANT CHANGE UP (ref 8.5–10.1)
CHLORIDE SERPL-SCNC: 111 MMOL/L — HIGH (ref 96–108)
CO2 SERPL-SCNC: 24 MMOL/L — SIGNIFICANT CHANGE UP (ref 22–31)
CREAT SERPL-MCNC: 0.77 MG/DL — SIGNIFICANT CHANGE UP (ref 0.5–1.3)
GLUCOSE SERPL-MCNC: 125 MG/DL — HIGH (ref 70–99)
HCT VFR BLD CALC: 38.1 % — SIGNIFICANT CHANGE UP (ref 34.5–45)
HGB BLD-MCNC: 12.3 G/DL — SIGNIFICANT CHANGE UP (ref 11.5–15.5)
MCHC RBC-ENTMCNC: 30.5 PG — SIGNIFICANT CHANGE UP (ref 27–34)
MCHC RBC-ENTMCNC: 32.3 GM/DL — SIGNIFICANT CHANGE UP (ref 32–36)
MCV RBC AUTO: 94.5 FL — SIGNIFICANT CHANGE UP (ref 80–100)
NRBC # BLD: 0 /100 WBCS — SIGNIFICANT CHANGE UP (ref 0–0)
PLATELET # BLD AUTO: 221 K/UL — SIGNIFICANT CHANGE UP (ref 150–400)
POTASSIUM SERPL-MCNC: 4.1 MMOL/L — SIGNIFICANT CHANGE UP (ref 3.5–5.3)
POTASSIUM SERPL-SCNC: 4.1 MMOL/L — SIGNIFICANT CHANGE UP (ref 3.5–5.3)
RBC # BLD: 4.03 M/UL — SIGNIFICANT CHANGE UP (ref 3.8–5.2)
RBC # FLD: 13.2 % — SIGNIFICANT CHANGE UP (ref 10.3–14.5)
SODIUM SERPL-SCNC: 142 MMOL/L — SIGNIFICANT CHANGE UP (ref 135–145)
WBC # BLD: 12.22 K/UL — HIGH (ref 3.8–10.5)
WBC # FLD AUTO: 12.22 K/UL — HIGH (ref 3.8–10.5)

## 2020-10-10 RX ORDER — ZOLPIDEM TARTRATE 10 MG/1
5 TABLET ORAL AT BEDTIME
Refills: 0 | Status: DISCONTINUED | OUTPATIENT
Start: 2020-10-10 | End: 2020-10-11

## 2020-10-10 RX ORDER — METOPROLOL TARTRATE 50 MG
25 TABLET ORAL ONCE
Refills: 0 | Status: COMPLETED | OUTPATIENT
Start: 2020-10-10 | End: 2020-10-10

## 2020-10-10 RX ORDER — MAGNESIUM HYDROXIDE 400 MG/1
30 TABLET, CHEWABLE ORAL DAILY
Refills: 0 | Status: DISCONTINUED | OUTPATIENT
Start: 2020-10-10 | End: 2020-10-11

## 2020-10-10 RX ADMIN — SODIUM CHLORIDE 3 MILLILITER(S): 9 INJECTION INTRAMUSCULAR; INTRAVENOUS; SUBCUTANEOUS at 05:21

## 2020-10-10 RX ADMIN — SODIUM CHLORIDE 500 MILLILITER(S): 9 INJECTION, SOLUTION INTRAVENOUS at 05:40

## 2020-10-10 RX ADMIN — SODIUM CHLORIDE 3 MILLILITER(S): 9 INJECTION INTRAMUSCULAR; INTRAVENOUS; SUBCUTANEOUS at 20:16

## 2020-10-10 RX ADMIN — Medication 30 MILLIGRAM(S): at 05:53

## 2020-10-10 RX ADMIN — Medication 975 MILLIGRAM(S): at 20:44

## 2020-10-10 RX ADMIN — PANTOPRAZOLE SODIUM 40 MILLIGRAM(S): 20 TABLET, DELAYED RELEASE ORAL at 08:05

## 2020-10-10 RX ADMIN — CELECOXIB 200 MILLIGRAM(S): 200 CAPSULE ORAL at 18:47

## 2020-10-10 RX ADMIN — CELECOXIB 200 MILLIGRAM(S): 200 CAPSULE ORAL at 06:37

## 2020-10-10 RX ADMIN — Medication 100 MILLIGRAM(S): at 05:37

## 2020-10-10 RX ADMIN — Medication 30 MILLIGRAM(S): at 18:48

## 2020-10-10 RX ADMIN — Medication 81 MILLIGRAM(S): at 17:56

## 2020-10-10 RX ADMIN — OXYCODONE HYDROCHLORIDE 10 MILLIGRAM(S): 5 TABLET ORAL at 09:56

## 2020-10-10 RX ADMIN — Medication 30 MILLIGRAM(S): at 23:54

## 2020-10-10 RX ADMIN — CELECOXIB 200 MILLIGRAM(S): 200 CAPSULE ORAL at 17:56

## 2020-10-10 RX ADMIN — Medication 30 MILLIGRAM(S): at 17:56

## 2020-10-10 RX ADMIN — POLYETHYLENE GLYCOL 3350 17 GRAM(S): 17 POWDER, FOR SOLUTION ORAL at 11:40

## 2020-10-10 RX ADMIN — Medication 25 MILLIGRAM(S): at 23:54

## 2020-10-10 RX ADMIN — Medication 6 MILLIGRAM(S): at 05:40

## 2020-10-10 RX ADMIN — SODIUM CHLORIDE 3 MILLILITER(S): 9 INJECTION INTRAMUSCULAR; INTRAVENOUS; SUBCUTANEOUS at 13:22

## 2020-10-10 RX ADMIN — CELECOXIB 200 MILLIGRAM(S): 200 CAPSULE ORAL at 05:37

## 2020-10-10 RX ADMIN — Medication 30 MILLIGRAM(S): at 11:55

## 2020-10-10 RX ADMIN — OXYCODONE HYDROCHLORIDE 10 MILLIGRAM(S): 5 TABLET ORAL at 08:56

## 2020-10-10 RX ADMIN — Medication 30 MILLIGRAM(S): at 11:40

## 2020-10-10 RX ADMIN — Medication 30 MILLILITER(S): at 20:45

## 2020-10-10 RX ADMIN — Medication 81 MILLIGRAM(S): at 05:38

## 2020-10-10 RX ADMIN — SENNA PLUS 2 TABLET(S): 8.6 TABLET ORAL at 05:37

## 2020-10-10 RX ADMIN — Medication 30 MILLIGRAM(S): at 05:38

## 2020-10-10 RX ADMIN — AMLODIPINE BESYLATE 5 MILLIGRAM(S): 2.5 TABLET ORAL at 05:38

## 2020-10-10 RX ADMIN — ZOLPIDEM TARTRATE 5 MILLIGRAM(S): 10 TABLET ORAL at 23:42

## 2020-10-10 RX ADMIN — Medication 975 MILLIGRAM(S): at 21:44

## 2020-10-10 NOTE — PHYSICAL THERAPY INITIAL EVALUATION ADULT - GENERAL OBSERVATIONS, REHAB EVAL
Pt encountered supine in bed, A&Ox4, +pneumatic TEDs, +IV (removed prior to ambulation), +ice pack to R hip, +C/D/I dressing to R hip, no complaints of pain, NAD and comfortable.

## 2020-10-10 NOTE — PHYSICAL THERAPY INITIAL EVALUATION ADULT - GAIT TRAINING, PT EVAL
Patient will ambulate 500 feet with rolling walker independently for community ambulation in 2-3 days. Patient will ascend/descend 2 steps with axillary crutches independently in 2-3 days to safely navigate home environment.

## 2020-10-10 NOTE — DISCHARGE NOTE NURSING/CASE MANAGEMENT/SOCIAL WORK - PATIENT PORTAL LINK FT
You can access the FollowMyHealth Patient Portal offered by [facility] by registering at the following website: [email or web address]. By joining [employer]’s FollowMyHealth portal, you will also be able to view your health information using other applications (apps) compatible with our system.

## 2020-10-10 NOTE — OCCUPATIONAL THERAPY INITIAL EVALUATION ADULT - LOWER BODY DRESSING, ASSISTIVE DEVICE, OT EVAL
Patient needs a refill of her Hydrocodone and her preferred pharmacy is the [facility] in [employer] on [address] in [place].    reacher/sock-aid

## 2020-10-10 NOTE — OCCUPATIONAL THERAPY INITIAL EVALUATION ADULT - BALANCE TRAINING, PT EVAL
Pt will increase dynamic standing balance to good to complete self care tasks independently in 2 weeks

## 2020-10-10 NOTE — OCCUPATIONAL THERAPY INITIAL EVALUATION ADULT - ADDITIONAL COMMENTS
Pre op assessment confirmed at bedside - Patient lives with  (Who can assist and also patients sister) in a private house with 2 steps and no rails. Once inside, the patient main bedroom and bathroom is on that floor when entering. The bathroom has a walk in shower, fixed shower head, standard toilet seat and no grab bars. The patient owns a shower bench. The patient reports that a 3/1 commode can fit over the toilet at home.

## 2020-10-10 NOTE — PROGRESS NOTE ADULT - SUBJECTIVE AND OBJECTIVE BOX
Patient seen and examined. Pain controlled. Patient had issues with unable to urinate overnight, Bladder scan showed 900ml, was straight cath'd.  Patient states she feels much better this AM.  Denies any CP/SOB/Fever/Chills. Was unable to work with PT yesterday due to abdominal pain from bladder.     MEDICATIONS  (STANDING):  acetaminophen   Tablet .. 975 milliGRAM(s) Oral every 6 hours  acetaminophen  IVPB .. 1000 milliGRAM(s) IV Intermittent once  amLODIPine   Tablet 5 milliGRAM(s) Oral daily  aspirin enteric coated 81 milliGRAM(s) Oral two times a day  celecoxib 200 milliGRAM(s) Oral two times a day  influenza   Vaccine 0.5 milliLiter(s) IntraMuscular once  ketorolac   Injectable 30 milliGRAM(s) IV Push every 6 hours  pantoprazole    Tablet 40 milliGRAM(s) Oral before breakfast  polyethylene glycol 3350 17 Gram(s) Oral daily  sodium chloride 0.9% lock flush 3 milliLiter(s) IV Push every 8 hours    Allergies    No Known Allergies    Vital Signs Last 24 Hrs  T(C): 36.8 (10-10-20 @ 05:35), Max: 36.9 (10-09-20 @ 11:56)  T(F): 98.2 (10-10-20 @ 05:35), Max: 98.5 (10-09-20 @ 11:56)  HR: 70 (10-10-20 @ 05:35) (52 - 90)  BP: 142/70 (10-10-20 @ 05:35) (105/53 - 143/73)  RR: 17 (10-10-20 @ 05:35) (12 - 18)  SpO2: 95% (10-10-20 @ 05:35) (92% - 100%)    Physical Exam  Gen: NAD  Abdomen, non ttp, soft  RLE:   Dressing c/d/i  +ehl/fhl/ta/gs function  L2-S1 silt  Dp/pt pulse intact  No calf ttp  Compartments soft    A/P:  70y Female sp R Anterior ANTHONY POD 1    Continue post op abx  Pain control  DVT ppx- Ecotrin 81mg BID  PT/WBAT- no precautions   FU AN labs  Incentive spirometry  Dispo planning- Possibly home today pending PT recs  Will d/w Dr. Toscano and advise if plan changes

## 2020-10-10 NOTE — PROGRESS NOTE ADULT - ATTENDING COMMENTS
I agree with the above note on  this patient. All pertinent films have been reviewed. Please refer to clinical documentation of the history, physical examinations, data summary, and both assessment and plan as documented above and with which I agree.    Cole Toscano MD  Attending Orthopedic Surgeon

## 2020-10-10 NOTE — PHYSICAL THERAPY INITIAL EVALUATION ADULT - CRITERIA FOR SKILLED THERAPEUTIC INTERVENTIONS
functional limitations in following categories/impairments found/anticipated discharge recommendation/risk reduction/prevention/anticipated equipment needs at discharge

## 2020-10-10 NOTE — PHYSICAL THERAPY INITIAL EVALUATION ADULT - RANGE OF MOTION, PT EVAL
Patient will improve ROM in R hip to WFL in 6 weeks in order to improve gait quality, functional mobility and overall safety.

## 2020-10-10 NOTE — OCCUPATIONAL THERAPY INITIAL EVALUATION ADULT - NS ASR OT EQUIP NEEDS DISCH
Pt states they own rolling walker and 3:1 commode Pt states they own rolling walker and 3:1 commode, pt provided hip kit at bedside

## 2020-10-10 NOTE — OCCUPATIONAL THERAPY INITIAL EVALUATION ADULT - GENERAL OBSERVATIONS, REHAB EVAL
Pt encountered supine in bed, NAD, AXOX4. Pt c/o of pain s/p right total hip replacement. Pt encountered supine in bed, NAD, AXOX4. Pt c/o of pain s/p right total hip replacement, anterior approach.

## 2020-10-11 VITALS — TEMPERATURE: 98 F

## 2020-10-11 RX ADMIN — CELECOXIB 200 MILLIGRAM(S): 200 CAPSULE ORAL at 07:44

## 2020-10-11 RX ADMIN — Medication 975 MILLIGRAM(S): at 12:06

## 2020-10-11 RX ADMIN — Medication 30 MILLIGRAM(S): at 07:26

## 2020-10-11 RX ADMIN — Medication 30 MILLIGRAM(S): at 11:21

## 2020-10-11 RX ADMIN — AMLODIPINE BESYLATE 5 MILLIGRAM(S): 2.5 TABLET ORAL at 06:44

## 2020-10-11 RX ADMIN — Medication 975 MILLIGRAM(S): at 11:06

## 2020-10-11 RX ADMIN — Medication 975 MILLIGRAM(S): at 07:43

## 2020-10-11 RX ADMIN — Medication 81 MILLIGRAM(S): at 06:44

## 2020-10-11 RX ADMIN — SODIUM CHLORIDE 3 MILLILITER(S): 9 INJECTION INTRAMUSCULAR; INTRAVENOUS; SUBCUTANEOUS at 06:44

## 2020-10-11 RX ADMIN — CELECOXIB 200 MILLIGRAM(S): 200 CAPSULE ORAL at 06:44

## 2020-10-11 RX ADMIN — Medication 30 MILLIGRAM(S): at 11:06

## 2020-10-11 RX ADMIN — PANTOPRAZOLE SODIUM 40 MILLIGRAM(S): 20 TABLET, DELAYED RELEASE ORAL at 06:44

## 2020-10-11 RX ADMIN — POLYETHYLENE GLYCOL 3350 17 GRAM(S): 17 POWDER, FOR SOLUTION ORAL at 11:06

## 2020-10-11 RX ADMIN — Medication 975 MILLIGRAM(S): at 06:43

## 2020-10-11 RX ADMIN — ZOLPIDEM TARTRATE 5 MILLIGRAM(S): 10 TABLET ORAL at 00:51

## 2020-10-11 RX ADMIN — Medication 30 MILLIGRAM(S): at 06:44

## 2020-10-11 RX ADMIN — Medication 30 MILLIGRAM(S): at 00:10

## 2020-10-11 NOTE — PROGRESS NOTE ADULT - SUBJECTIVE AND OBJECTIVE BOX
Patient seen and examined. Pain controlled. Patient yesterday felt some weakness and headaches with abdominal pain so was unable to fully work with PT. She had a BM yesterday night and felt better. Today she states she feels stronger. Her pain is controlled and she denies any numbness or tingling. No acute events over night    Vital Signs Last 24 Hrs  T(C): 36.7 (11 Oct 2020 05:59), Max: 37.1 (10 Oct 2020 09:35)  T(F): 98 (11 Oct 2020 05:59), Max: 98.7 (10 Oct 2020 09:35)  HR: 60 (11 Oct 2020 05:59) (60 - 79)  BP: 154/62 (11 Oct 2020 05:59) (119/58 - 172/70)  BP(mean): --  RR: 15 (11 Oct 2020 05:59) (15 - 18)  SpO2: 96% (11 Oct 2020 05:59) (94% - 98%)    Physical Exam  Gen: NAD  Abdomen, non ttp, soft  RLE:   Dressing c/d/i  +ehl/fhl/ta/gs function  L2-S1 silt  Dp/pt pulse intact  No calf ttp  Compartments soft    A/P:  70y Female sp R Anterior ANTHONY POD 2      Pain control  DVT ppx- Ecotrin 81mg BID  PT/WBAT- no precautions   Incentive spirometry  Dispo planning- home   Will d/w Dr. Toscano and advise if plan changes

## 2020-10-11 NOTE — PROGRESS NOTE ADULT - ATTENDING COMMENTS
I agree with the above note on  this patient. All pertinent films have been reviewed. Please refer to clinical documentation of the history, physical examinations, data summary, and both assessment and plan as documented above and with which I agree.    Cole Toscano MD  Attending Orthopedic Surgeon I agree with the above note and have personally seen and examined this patient. All pertinent films have been reviewed. Please refer to clinical documentation of the history, physical examinations, data summary, and both assessment and plan as documented above and with which I agree.    Cole Toscano MD  Attending Orthopedic Surgeon

## 2020-10-16 DIAGNOSIS — M16.11 UNILATERAL PRIMARY OSTEOARTHRITIS, RIGHT HIP: ICD-10-CM

## 2020-10-16 DIAGNOSIS — M85.80 OTHER SPECIFIED DISORDERS OF BONE DENSITY AND STRUCTURE, UNSPECIFIED SITE: ICD-10-CM

## 2020-10-16 DIAGNOSIS — Z86.73 PERSONAL HISTORY OF TRANSIENT ISCHEMIC ATTACK (TIA), AND CEREBRAL INFARCTION WITHOUT RESIDUAL DEFICITS: ICD-10-CM

## 2020-10-16 DIAGNOSIS — K21.9 GASTRO-ESOPHAGEAL REFLUX DISEASE WITHOUT ESOPHAGITIS: ICD-10-CM

## 2020-10-16 DIAGNOSIS — I10 ESSENTIAL (PRIMARY) HYPERTENSION: ICD-10-CM

## 2020-10-16 DIAGNOSIS — E78.5 HYPERLIPIDEMIA, UNSPECIFIED: ICD-10-CM

## 2020-10-29 ENCOUNTER — APPOINTMENT (OUTPATIENT)
Dept: ORTHOPEDIC SURGERY | Facility: CLINIC | Age: 70
End: 2020-10-29
Payer: MEDICARE

## 2020-10-29 VITALS — BODY MASS INDEX: 31.41 KG/M2 | HEIGHT: 60 IN | WEIGHT: 160 LBS

## 2020-10-29 DIAGNOSIS — Z01.818 ENCOUNTER FOR OTHER PREPROCEDURAL EXAMINATION: ICD-10-CM

## 2020-10-29 DIAGNOSIS — M16.11 UNILATERAL PRIMARY OSTEOARTHRITIS, RIGHT HIP: ICD-10-CM

## 2020-10-29 PROCEDURE — 73502 X-RAY EXAM HIP UNI 2-3 VIEWS: CPT | Mod: RT

## 2020-10-29 PROCEDURE — 99024 POSTOP FOLLOW-UP VISIT: CPT

## 2020-10-29 RX ORDER — ERGOCALCIFEROL 1.25 MG/1
1.25 MG CAPSULE, LIQUID FILLED ORAL
Qty: 12 | Refills: 0 | Status: ACTIVE | COMMUNITY
Start: 2020-10-11

## 2020-10-29 NOTE — HISTORY OF PRESENT ILLNESS
[de-identified] : Status-post right total hip arthroplasty here for initial postoperative evaluation. Excellent progress is noted in terms of pain and restoration of function. Pain is well controlled with oral medications. There has been no change in medical health since discharge. The patient does require assistive devices.

## 2020-10-29 NOTE — DISCUSSION/SUMMARY
[de-identified] : 2-week status post right total hip arthroplasty doing very well. Written infectious precautions were reviewed. The patient will progress with physical therapy at this time and they will work on transitioning from requiring assistive devices for ambulation. Aspirin therapy will be continued for the full 4-week postoperative course for the purpose of orthopedic thromboembolism prophylaxis. Return around the 6 week anniversary from surgery for follow-up evaluation.

## 2020-10-29 NOTE — PHYSICAL EXAM
[de-identified] : Patient is well nourished, well-developed, in no acute distress, with appropriate mood and affect. The patient is oriented to time, place, and person. Respirations are even and unlabored. Examination reveals satisfactory wound healing. No surrounding erythema. Motion is good and relatively pain free. Leg lengths are acceptable.\par \par  [de-identified] : AP pelvis, AP hip, and lateral x-rays of the right hip were ordered and obtained in the office and demonstrate satisfactory position and alignment of the components are present. No signs of loosening are seen.

## 2020-10-29 NOTE — REVIEW OF SYSTEMS
[Arthralgia] : no arthralgia [Joint Pain] : no joint pain [Joint Stiffness] : no joint stiffness [Negative] : Heme/Lymph

## 2020-12-01 ENCOUNTER — NON-APPOINTMENT (OUTPATIENT)
Age: 70
End: 2020-12-01

## 2020-12-14 ENCOUNTER — APPOINTMENT (OUTPATIENT)
Dept: ORTHOPEDIC SURGERY | Facility: CLINIC | Age: 70
End: 2020-12-14

## 2021-01-07 ENCOUNTER — FORM ENCOUNTER (OUTPATIENT)
Age: 71
End: 2021-01-07

## 2021-01-14 ENCOUNTER — APPOINTMENT (OUTPATIENT)
Dept: OTOLARYNGOLOGY | Facility: CLINIC | Age: 71
End: 2021-01-14

## 2021-06-24 ENCOUNTER — APPOINTMENT (OUTPATIENT)
Dept: ORTHOPEDIC SURGERY | Facility: CLINIC | Age: 71
End: 2021-06-24
Payer: MEDICARE

## 2021-06-24 DIAGNOSIS — M25.551 PAIN IN RIGHT HIP: ICD-10-CM

## 2021-06-24 PROCEDURE — 99214 OFFICE O/P EST MOD 30 MIN: CPT

## 2021-06-24 PROCEDURE — 73502 X-RAY EXAM HIP UNI 2-3 VIEWS: CPT | Mod: RT

## 2021-06-24 NOTE — DISCUSSION/SUMMARY
[de-identified] : 6-month status post right total hip arthroplasty.  Has some weakness and pain in the hip flexors.  The patient is not an appropriate candidate for surgical intervention at this time. An extensive discussion was conducted on the natural history of the disease and the variety of surgical and non-surgical options available to the patient including, but not limited to non-steroidal anti-inflammatory medications, steroid injections, physical therapy, maintenance of ideal body weight, and reduction of activity.  Physical therapy prescribed.  Meloxicam prescribed.\par Follow-up in 6 months.\par

## 2021-06-24 NOTE — HISTORY OF PRESENT ILLNESS
[de-identified] : This is very nice 71-year-old female here for interim evaluation of right total hip arthroplasty. The patient reports good pain relief since surgery in the replaced joint and satisfactory restoration of function in terms of activities of daily living.  She has some weakness in her right thigh that started after she stopped physical therapy.  She never followed up after surgery since her 2-week visit.  It is not clear why she did not follow-up.  She does not use a cane or walker for ambulation.  Ambulates for 5 minutes before resting.  She takes Tylenol for pain which somewhat.  The patient denies any radiation of the pain to the feet and it is not associated with numbness, tingling, or weakness.\par

## 2021-06-24 NOTE — PHYSICAL EXAM
[de-identified] : Patient is well nourished, well-developed, in no acute distress, with appropriate mood and affect. The patient is oriented to time, place, and person. Respirations are even and unlabored. Gait evaluation does not reveal a limp. There is no inguinal adenopathy. Examination of the contralateral hip shows normal range of motion, strength, no tenderness, and intact skin. The affected limb is well-perfused and showed 2+ dp/pt pulses, without skin lesions, shows a grossly normal motor and sensory examination. Examination of the hip shows a well healed surgical scar. Hip motion is full and painless from 0-90 degrees extension to flexion, 20 degrees adduction and 20 degrees abduction, and 15 degrees internal and 30 degrees external rotation. Leg lengths are approximately equal. Both hips are stable and muscle strength is normal with good strength with resisted abduction and adduction. Pedal pulses are palpable.  Mild tenderness to palpation the abductor musculature. [de-identified] : AP pelvis, AP hip, and lateral x-rays of the right hip were ordered and obtained in the office and demonstrate satisfactory position and alignment of the components are present. No signs of loosening are seen.

## 2022-04-06 ENCOUNTER — APPOINTMENT (OUTPATIENT)
Dept: ORTHOPEDIC SURGERY | Facility: CLINIC | Age: 72
End: 2022-04-06
Payer: MEDICARE

## 2022-04-06 VITALS
WEIGHT: 166 LBS | DIASTOLIC BLOOD PRESSURE: 82 MMHG | BODY MASS INDEX: 32.59 KG/M2 | HEART RATE: 69 BPM | SYSTOLIC BLOOD PRESSURE: 130 MMHG | HEIGHT: 60 IN

## 2022-04-06 DIAGNOSIS — N95.9 UNSPECIFIED MENOPAUSAL AND PERIMENOPAUSAL DISORDER: ICD-10-CM

## 2022-04-06 DIAGNOSIS — Z85.3 PERSONAL HISTORY OF MALIGNANT NEOPLASM OF BREAST: ICD-10-CM

## 2022-04-06 DIAGNOSIS — M79.646 PAIN IN UNSPECIFIED FINGER(S): ICD-10-CM

## 2022-04-06 DIAGNOSIS — Z86.79 PERSONAL HISTORY OF OTHER DISEASES OF THE CIRCULATORY SYSTEM: ICD-10-CM

## 2022-04-06 PROCEDURE — 73140 X-RAY EXAM OF FINGER(S): CPT | Mod: F5

## 2022-04-06 PROCEDURE — 99214 OFFICE O/P EST MOD 30 MIN: CPT

## 2022-04-06 RX ORDER — NAPROXEN 500 MG/1
500 TABLET, DELAYED RELEASE ORAL
Qty: 30 | Refills: 0 | Status: DISCONTINUED | COMMUNITY
Start: 2020-10-14 | End: 2022-04-06

## 2022-04-06 RX ORDER — PANTOPRAZOLE 40 MG/1
40 TABLET, DELAYED RELEASE ORAL
Qty: 14 | Refills: 0 | Status: DISCONTINUED | COMMUNITY
Start: 2020-10-10 | End: 2022-04-06

## 2022-04-06 RX ORDER — OMEPRAZOLE 20 MG/1
TABLET, DELAYED RELEASE ORAL
Refills: 0 | Status: ACTIVE | COMMUNITY

## 2022-04-06 RX ORDER — MELOXICAM 15 MG/1
15 TABLET ORAL
Qty: 30 | Refills: 2 | Status: DISCONTINUED | COMMUNITY
Start: 2021-06-24 | End: 2022-04-06

## 2022-04-06 RX ORDER — OXYCODONE 5 MG/1
5 TABLET ORAL
Qty: 42 | Refills: 0 | Status: DISCONTINUED | COMMUNITY
Start: 2020-10-10 | End: 2022-04-06

## 2022-04-06 RX ORDER — TRAMADOL HYDROCHLORIDE 50 MG/1
50 TABLET, COATED ORAL
Qty: 28 | Refills: 0 | Status: DISCONTINUED | COMMUNITY
Start: 2020-10-10 | End: 2022-04-06

## 2022-04-06 NOTE — HISTORY OF PRESENT ILLNESS
[Right] : right hand dominant [FreeTextEntry1] : She comes in today for evaluation of right thumb pain. She denies injury. She has had 3 surgeries to the right thumb, the most recent of which was 20 years ago. She is unsure of the surgeon who performed the procedures. She notes changes to the nail bed of the thumb. She denies numbness and tingling, She is not taking medication for pain. She rates her pain as a 6 out of 10. \par \par She is accompanied by her son today.

## 2022-04-06 NOTE — DISCUSSION/SUMMARY
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 At this time, I have recommended surgical excision.  She and her son have agreed to proceed.\par \par -  The nature and purposes of the operation/procedure was discussed in detail.  I discussed the surgical procedure in detail, as well as expected postoperative recovery and outcome.\par -  Possible risks, benefits, and complications (from known and unknown causes) of the procedure were discussed in detail.  \par -  Possible non-operative alternatives to the proposed treatment were discussed in detail.  \par -  She was told that possible risks/complications include, but are not limited to:  Infection, digital nerve or vessel injury, stiffness, painful scar, poor outcome, need for additional surgical procedures, and other unforeseen complications.  She does understand that as she has had 3 prior surgeries at this thumb, she is at high risk for these complications including possible injury to the ulnar digital nerve.\par -  In addition, the possibility of an "unsuccessful outcome," despite "successful surgery," was discussed with her and her son.  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  However, other possible etiologies exist.  In addition to possible need for further surgery or treatment was discussed with her and her son.\par -   She has been scheduled for surgery but I will schedule her for a preoperative MRI to better evaluate the thumb as well.\par -  The patient fully understands these risks and wishes to proceed.  \par -  I had a lengthy discussion with the patient regarding today's visit, the diagnosis, and my surgical treatment recommendations.  The patient has agreed to this plan of management and has expressed full understanding.  All questions were fully answered to the patient's satisfaction. \par \par My cumulative time spent on this visit included: Preparation for the visit, review of the medical records, review of pertinent diagnostic studies, examination and counseling of the patient on the above diagnosis, treatment plan and prognosis, orders of diagnostic tests, medication and/or appropriate procedures and documentation in the medical records of today's visit.

## 2022-04-06 NOTE — END OF VISIT
[FreeTextEntry3] : This note was written by Deena High on 04/06/2022 acting solely as a scribe for Dr. Filiberto Bell.\par  \par All medical record entries made by the Scribe were at my, Dr. Filiberto Bell, direction and personally dictated by me on 04/06/2022. I have personally reviewed the chart and agree that the record accurately reflects my personal performance of the history, physical exam, assessment and plan.

## 2022-04-06 NOTE — PHYSICAL EXAM
[de-identified] : - Constitutional: This is a female in no obvious distress.  She is accompanied by her son today. \par - Psych: Patient is alert and oriented to person, place and time.  Patient has a normal mood and affect.\par - Cardiovascular: Normal pulses throughout the upper extremities.  No significant varicosities are noted in the upper extremities. \par - Neuro: Strength and sensation are intact throughout the upper extremities.  Patient has normal coordination.\par - Respiratory:  Patient exhibits no evidence of shortness of breath or difficulty breathing.\par - Skin: No rashes, lesions, or other abnormalities are noted in the upper extremities.\par \par --- \par \par Examination of his right thumb demonstrates a mass along the ulnar aspect of the distal phalanx most notably palmarly.  It is tender.  She also has a deformity along the ulnar aspect of the nail with swelling dorsally and a deformity of the nail fold and ridging of the nail.  She is neurovascularly intact distally. [de-identified] : AP, lateral, and oblique radiographs of the right thumb demonstrate a deformity at the distal phalanx with erosion along the ulnar aspect of the distal phalanx.  This is well-circumscribed and appears chronic in nature.

## 2022-04-06 NOTE — ADDENDUM
[FreeTextEntry1] : I, Deena High, acted solely as a scribe for Dr. Bell on this date on 04/06/2022.

## 2022-04-11 ENCOUNTER — NON-APPOINTMENT (OUTPATIENT)
Age: 72
End: 2022-04-11

## 2022-04-11 ENCOUNTER — OUTPATIENT (OUTPATIENT)
Dept: OUTPATIENT SERVICES | Facility: HOSPITAL | Age: 72
LOS: 1 days | End: 2022-04-11
Payer: MEDICARE

## 2022-04-11 VITALS
HEIGHT: 60 IN | SYSTOLIC BLOOD PRESSURE: 150 MMHG | OXYGEN SATURATION: 97 % | DIASTOLIC BLOOD PRESSURE: 75 MMHG | RESPIRATION RATE: 14 BRPM | WEIGHT: 164.02 LBS | HEART RATE: 58 BPM | TEMPERATURE: 97 F

## 2022-04-11 DIAGNOSIS — Z98.890 OTHER SPECIFIED POSTPROCEDURAL STATES: Chronic | ICD-10-CM

## 2022-04-11 DIAGNOSIS — R22.31 LOCALIZED SWELLING, MASS AND LUMP, RIGHT UPPER LIMB: ICD-10-CM

## 2022-04-11 DIAGNOSIS — Z01.818 ENCOUNTER FOR OTHER PREPROCEDURAL EXAMINATION: ICD-10-CM

## 2022-04-11 DIAGNOSIS — Z98.891 HISTORY OF UTERINE SCAR FROM PREVIOUS SURGERY: Chronic | ICD-10-CM

## 2022-04-11 DIAGNOSIS — D21.11 BENIGN NEOPLASM OF CONNECTIVE AND OTHER SOFT TISSUE OF RIGHT UPPER LIMB, INCLUDING SHOULDER: ICD-10-CM

## 2022-04-11 LAB
ANION GAP SERPL CALC-SCNC: 7 MMOL/L — SIGNIFICANT CHANGE UP (ref 5–17)
BUN SERPL-MCNC: 21 MG/DL — SIGNIFICANT CHANGE UP (ref 7–23)
CALCIUM SERPL-MCNC: 9.4 MG/DL — SIGNIFICANT CHANGE UP (ref 8.4–10.5)
CHLORIDE SERPL-SCNC: 103 MMOL/L — SIGNIFICANT CHANGE UP (ref 96–108)
CO2 SERPL-SCNC: 30 MMOL/L — SIGNIFICANT CHANGE UP (ref 22–31)
CREAT SERPL-MCNC: 0.87 MG/DL — SIGNIFICANT CHANGE UP (ref 0.5–1.3)
EGFR: 70 ML/MIN/1.73M2 — SIGNIFICANT CHANGE UP
GLUCOSE SERPL-MCNC: 95 MG/DL — SIGNIFICANT CHANGE UP (ref 70–99)
HCT VFR BLD CALC: 41.1 % — SIGNIFICANT CHANGE UP (ref 34.5–45)
HGB BLD-MCNC: 13.5 G/DL — SIGNIFICANT CHANGE UP (ref 11.5–15.5)
MCHC RBC-ENTMCNC: 31.4 PG — SIGNIFICANT CHANGE UP (ref 27–34)
MCHC RBC-ENTMCNC: 32.8 GM/DL — SIGNIFICANT CHANGE UP (ref 32–36)
MCV RBC AUTO: 95.6 FL — SIGNIFICANT CHANGE UP (ref 80–100)
NRBC # BLD: 0 /100 WBCS — SIGNIFICANT CHANGE UP (ref 0–0)
PLATELET # BLD AUTO: 219 K/UL — SIGNIFICANT CHANGE UP (ref 150–400)
POTASSIUM SERPL-MCNC: 4.4 MMOL/L — SIGNIFICANT CHANGE UP (ref 3.5–5.3)
POTASSIUM SERPL-SCNC: 4.4 MMOL/L — SIGNIFICANT CHANGE UP (ref 3.5–5.3)
RBC # BLD: 4.3 M/UL — SIGNIFICANT CHANGE UP (ref 3.8–5.2)
RBC # FLD: 13.2 % — SIGNIFICANT CHANGE UP (ref 10.3–14.5)
SODIUM SERPL-SCNC: 140 MMOL/L — SIGNIFICANT CHANGE UP (ref 135–145)
WBC # BLD: 5.83 K/UL — SIGNIFICANT CHANGE UP (ref 3.8–10.5)
WBC # FLD AUTO: 5.83 K/UL — SIGNIFICANT CHANGE UP (ref 3.8–10.5)

## 2022-04-11 PROCEDURE — 36415 COLL VENOUS BLD VENIPUNCTURE: CPT

## 2022-04-11 PROCEDURE — 93005 ELECTROCARDIOGRAM TRACING: CPT

## 2022-04-11 PROCEDURE — G0463: CPT

## 2022-04-11 PROCEDURE — 80048 BASIC METABOLIC PNL TOTAL CA: CPT

## 2022-04-11 PROCEDURE — 85027 COMPLETE CBC AUTOMATED: CPT

## 2022-04-11 PROCEDURE — 93010 ELECTROCARDIOGRAM REPORT: CPT

## 2022-04-11 NOTE — H&P PST ADULT - PROBLEM SELECTOR PLAN 1
excision of recurrent right thumb tumor. medical clearance requested. instructed to take amlodipine and omeprazole AM of surgery with sips of water and instructed to stop red yeast rice and MVI 1 week prior to surgery. surgical wash instructions reviewed and verbalized understanding. covid PCR appt. confirmed for 4/16/22 at 1200.

## 2022-04-11 NOTE — H&P PST ADULT - NS PRO OT REFERRAL QUES 2 YN
"DAILY PROGRESS NOTE  [facility]    Patient Identification:  Name: Mandy Alarcon  Age: 85 y.o.  Sex: female  :  1934  MRN: [id number]         Primary Care Physician: Daryl Santos MD    Subjective:  Interval History:She is sleepy.  Some pain.    Objective:    Scheduled Meds:  atenolol 50 mg Oral Q12H   enoxaparin 30 mg Subcutaneous Q24H   famotidine 20 mg Intravenous Daily   levothyroxine 50 mcg Oral Q AM   sodium chloride 10 mL Intravenous Q12H     Continuous Infusions:     Vital signs in last 24 hours:  Temp:  [97.4 °F (36.3 °C)-98.7 °F (37.1 °C)] 98.7 °F (37.1 °C)  Heart Rate:  [51-62] 62  Resp:  [18] 18  BP: (108-122)/(48-53) 121/53    Intake/Output:    Intake/Output Summary (Last 24 hours) at 3/1/2020 1544  Last data filed at 3/1/2020 1401  Gross per 24 hour   Intake 240 ml   Output 100 ml   Net 140 ml       Exam:  /53 (BP Location: Left arm, Patient Position: Lying)   Pulse 62   Temp 98.7 °F (37.1 °C) (Oral)   Resp 18   Ht 160 cm (63\")   Wt 95 kg (209 lb 7 oz)   SpO2 98%   BMI 37.10 kg/m²     General Appearance:    Alert, cooperative, no distress   Head:    Normocephalic, without obvious abnormality, atraumatic   Eyes:       Throat:   Lips, tongue, gums normal   Neck:   Supple, symmetrical, trachea midline, no JVD   Lungs:     Clear to auscultation bilaterally, respirations unlabored   Chest Wall:    No tenderness or deformity    Heart:    Regular rate and rhythm, S1 and S2 normal, no murmur,no  Rub or gallop   Abdomen:     Soft,upper abdominal tenderness, bowel sounds active, no masses, no organomegaly    Extremities:   Extremities normal, atraumatic, no cyanosis or edema   Pulses:      Skin:   Skin is warm and dry,  no rashes or palpable lesions   Neurologic:   no focal deficits noted      Lab Results (last 72 hours)     Procedure Component Value Units Date/Time    Blood Culture - Blood, Hand, Right [[id number]] Collected:  20 1421    Specimen:  Blood from " Hand, Right Updated:  03/01/20 1445     Blood Culture No growth at 3 days    Blood Culture - Blood, Arm, Right [[id number]] Collected:  02/27/20 1017    Specimen:  Blood from Arm, Right Updated:  03/01/20 1030     Blood Culture No growth at 3 days    Comprehensive Metabolic Panel [[id number]]  (Abnormal) Collected:  03/01/20 0507    Specimen:  Blood Updated:  03/01/20 0552     Glucose 89 mg/dL      BUN 17 mg/dL      Creatinine 1.17 mg/dL      Sodium 131 mmol/L      Potassium 4.1 mmol/L      Chloride 100 mmol/L      CO2 20.5 mmol/L      Calcium 8.2 mg/dL      Total Protein 5.3 g/dL      Albumin 2.00 g/dL      ALT (SGPT) 9 U/L      AST (SGOT) 15 U/L      Alkaline Phosphatase 65 U/L      Total Bilirubin 0.5 mg/dL      eGFR Non African Amer 44 mL/min/1.73      Globulin 3.3 gm/dL      A/G Ratio 0.6 g/dL      BUN/Creatinine Ratio 14.5     Anion Gap 10.5 mmol/L     Narrative:       GFR Normal >60  Chronic Kidney Disease <60  Kidney Failure <15      CBC (No Diff) [[id number]]  (Abnormal) Collected:  03/01/20 0507    Specimen:  Blood Updated:  03/01/20 0530     WBC 10.75 10*3/mm3      RBC 3.53 10*6/mm3      Hemoglobin 10.2 g/dL      Hematocrit 31.3 %      MCV 88.7 fL      MCH 28.9 pg      MCHC 32.6 g/dL      RDW 13.7 %      RDW-SD 44.0 fl      MPV 9.0 fL      Platelets 198 10*3/mm3     Lipase [[id number]]  (Normal) Collected:  02/29/20 0623    Specimen:  Blood Updated:  02/29/20 0708     Lipase 36 U/L     Comprehensive Metabolic Panel [[id number]]  (Abnormal) Collected:  02/29/20 0623    Specimen:  Blood Updated:  02/29/20 0708     Glucose 82 mg/dL      BUN 14 mg/dL      Creatinine 1.15 mg/dL      Sodium 133 mmol/L      Potassium 4.2 mmol/L      Chloride 103 mmol/L      CO2 18.9 mmol/L      Calcium 7.9 mg/dL      Total Protein 5.1 g/dL      Albumin 2.10 g/dL      ALT (SGPT) 10 U/L      AST (SGOT) 16 U/L      Alkaline Phosphatase 70 U/L      Total Bilirubin 0.6 mg/dL      eGFR Non African Amer 45 mL/min/1.73      Globulin 3.0  gm/dL      A/G Ratio 0.7 g/dL      BUN/Creatinine Ratio 12.2     Anion Gap 11.1 mmol/L     Narrative:       GFR Normal >60  Chronic Kidney Disease <60  Kidney Failure <15      CBC (No Diff) [[id number]]  (Abnormal) Collected:  02/29/20 0623    Specimen:  Blood Updated:  02/29/20 0650     WBC 9.59 10*3/mm3      RBC 3.81 10*6/mm3      Hemoglobin 11.0 g/dL      Hematocrit 34.1 %      MCV 89.5 fL      MCH 28.9 pg      MCHC 32.3 g/dL      RDW 13.7 %      RDW-SD 44.6 fl      MPV 9.2 fL      Platelets 186 10*3/mm3     Comprehensive Metabolic Panel [[id number]]  (Abnormal) Collected:  02/28/20 0508    Specimen:  Blood Updated:  02/28/20 0612     Glucose 91 mg/dL      BUN 17 mg/dL      Creatinine 1.15 mg/dL      Sodium 133 mmol/L      Potassium 4.0 mmol/L      Chloride 100 mmol/L      CO2 21.4 mmol/L      Calcium 8.8 mg/dL      Total Protein 5.7 g/dL      Albumin 2.10 g/dL      ALT (SGPT) 11 U/L      AST (SGOT) 15 U/L      Alkaline Phosphatase 65 U/L      Total Bilirubin 0.6 mg/dL      eGFR Non African Amer 45 mL/min/1.73      Globulin 3.6 gm/dL      A/G Ratio 0.6 g/dL      BUN/Creatinine Ratio 14.8     Anion Gap 11.6 mmol/L     Narrative:       GFR Normal >60  Chronic Kidney Disease <60  Kidney Failure <15      Lipase [[id number]]  (Normal) Collected:  02/28/20 0508    Specimen:  Blood Updated:  02/28/20 0612     Lipase 52 U/L     Vancomycin, Random [[id number]]  (Normal) Collected:  02/28/20 0508    Specimen:  Blood Updated:  02/28/20 0609     Vancomycin Random 13.60 mcg/mL     CBC & Differential [[id number]] Collected:  02/28/20 0508    Specimen:  Blood Updated:  02/28/20 0538    Narrative:       The following orders were created for panel order CBC & Differential.  Procedure                               Abnormality         Status                     ---------                               -----------         ------                     CBC Auto Differential[[id number]]        Abnormal            Final result                      Please view results for these tests on the individual orders.    CBC Auto Differential [[id number]]  (Abnormal) Collected:  02/28/20 0508    Specimen:  Blood Updated:  02/28/20 0538     WBC 8.25 10*3/mm3      RBC 3.58 10*6/mm3      Hemoglobin 10.4 g/dL      Hematocrit 31.6 %      MCV 88.3 fL      MCH 29.1 pg      MCHC 32.9 g/dL      RDW 13.8 %      RDW-SD 44.5 fl      MPV 9.1 fL      Platelets 192 10*3/mm3      Neutrophil % 73.3 %      Lymphocyte % 10.2 %      Monocyte % 13.1 %      Eosinophil % 2.2 %      Basophil % 0.5 %      Immature Grans % 0.7 %      Neutrophils, Absolute 6.05 10*3/mm3      Lymphocytes, Absolute 0.84 10*3/mm3      Monocytes, Absolute 1.08 10*3/mm3      Eosinophils, Absolute 0.18 10*3/mm3      Basophils, Absolute 0.04 10*3/mm3      Immature Grans, Absolute 0.06 10*3/mm3      nRBC 0.0 /100 WBC         Data Review:  Results from last 7 days   Lab Units 03/01/20  0507 02/29/20  0623 02/28/20  0508   SODIUM mmol/L 131* 133* 133*   POTASSIUM mmol/L 4.1 4.2 4.0   CHLORIDE mmol/L 100 103 100   CO2 mmol/L 20.5* 18.9* 21.4*   BUN mg/dL 17 14 17   CREATININE mg/dL 1.17* 1.15* 1.15*   GLUCOSE mg/dL 89 82 91   CALCIUM mg/dL 8.2* 7.9* 8.8     Results from last 7 days   Lab Units 03/01/20  0507 02/29/20  0623 02/28/20  0508   WBC 10*3/mm3 10.75 9.59 8.25   HEMOGLOBIN g/dL 10.2* 11.0* 10.4*   HEMATOCRIT % 31.3* 34.1 31.6*   PLATELETS 10*3/mm3 198 186 192             Lab Results   Lab Value Date/Time    TROPONINT <0.010 01/31/2020 2142    TROPONINT <0.010 03/22/2019 1524    TROPONINT <0.010 07/10/2018 1038    TROPONINT <0.010 06/21/2018 0823    TROPONINT <0.010 06/20/2018 1738         Results from last 7 days   Lab Units 03/01/20  0507 02/29/20  0623 02/28/20  0508   ALK PHOS U/L 65 70 65   BILIRUBIN mg/dL 0.5 0.6 0.6   ALT (SGPT) U/L 9 10 11   AST (SGOT) U/L 15 16 15             No results found for: POCGLU        Past Medical History:   Diagnosis Date   • Benign essential hypertension 10/28/2012    October  --treatment for hypertension begun.   • Bilateral sensorineural hearing loss, wears hearing aids 2017    Left is much worse than right.  Patient had multiple ear infections as a child.   • Chronic renal insufficiency, stage II (mild), 2016--creatinine 1.35 2016--creatinine 1.35.  Baseline creatinine approximately 1.3.   • Claustrophobia 2016    This patient has significant nocturnal hypoxemia and I think that she could benefit from nocturnal oxygen therapy. The exact etiology of her hypoxemia is not clear. She could possibly have obstructive sleep apnea but this is not documented. We cannot test this patient for sleep apnea due to the fact that she is severely claustrophobic. Patient was a former smoker and it is possibly that COPD he is playing a role.   • Family history of coronary artery disease 2016    Patient's mother, father, 2 sisters and a brother all  from myocardial infarctions   • Gout 10/28/2012    2012--initial diagnosis and treatment of gout.   • Hyperlipidemia 10/28/2012    2012--treatment for hyperlipidemia begun.   • Impaired fasting glucose 10/28/2012    2012--initial diagnosis impaired fasting glucose.   • Morbidly obese (CMS/HCC) 3/11/2019   • Nocturnal hypoxemia 2014--patient did not receive nocturnal oxygen because of Medicare regulations.   05/15/2014--overnight oximetry revealed oxygen saturations less than 89% for 22 minutes and 40 seconds. Oxygen saturations less than or equal to 88% for 22 minutes and 40 seconds. Lowest oxygen saturation 83%. The longest continuous time with oxygen saturations less than or equal to 88% was 1 minute and 32 seconds.   2012--overnight oximetry revealed oxygen saturations less than 90% for one hour and 35 minutes. Oxygen saturations less than 89% 59 minutes. This patient has significant nocturnal hypoxemia and I think that she could benefit from nocturnal oxygen  therapy. The exact etiology of her hypoxemia is not clear. She could possibly have obstructive sleep apnea but this is not documented. We cannot test this patient for sleep apnea due to the fact that she is severely claustrophobic. Patient was a former smoker and it is possibly that COPD he is playing a role.   • Pancreatitis    • Pneumonia    • Primary hypothyroidism 11/17/2015 March 11, 2019--TSH remains elevated slightly at 4.92.  Given the overall clinical picture including the multinodular goiter, we will initiate levothyroxine 50 mcg/day and reassess in about 6 weeks.  August 1, 2018--thyroid ultrasound reveals a multinodular thyroid with multiple subcentimeter nodules.  Only minimal increase in size of the largest nodule in the left lobe has occurred when compared    • Secondary polycythemia 8/2/2012 11/06/2014--patient did not receive nocturnal oxygen because of Medicare regulations.   05/15/2014--overnight oximetry revealed oxygen saturations less than 89% for 22 minutes and 40 seconds. Oxygen saturations less than or equal to 88% for 22 minutes and 40 seconds. Lowest oxygen saturation 83%. The longest continuous time with oxygen saturations less than or equal to 88% was 1 minute and 32 seconds.   08/02/2012--overnight oximetry revealed oxygen saturations less than 90% for one hour and 35 minutes. Oxygen saturations less than 89% 59 minutes. This patient has significant nocturnal hypoxemia and I think that she could benefit from nocturnal oxygen therapy. The exact etiology of her hypoxemia is not clear. She could possibly have obstructive sleep apnea but this is not documented. We cannot test this patient for sleep apnea due to the fact that she is severely claustrophobic. Patient was a former smoker and it is possibly that COPD he is playing a role.   • Vitamin D deficiency 5/23/2016       Assessment:  Active Hospital Problems    Diagnosis  POA   • **Idiopathic acute pancreatitis [K85.00]  Yes   •  Fever [R50.9]  Yes   • Hyponatremia [E87.1]  Yes   • LINNEA (acute kidney injury) (CMS/HCC) [N17.9]  Yes   • CKD (chronic kidney disease) stage 3, GFR 30-59 ml/min (CMS/HCC) [N18.3]  Yes   • Obesity (BMI 30-39.9) [E66.9]  Yes   • Supraventricular tachycardia (CMS/HCC) [I47.1]  Yes   • Primary hypothyroidism [E03.9]  Yes   • Hyperlipidemia [E78.5]  Yes      Resolved Hospital Problems   No resolved problems to display.       Plan:  Continue current RX. As per GI and surgery. Cholecystectomy sometime.  Follow lab.    Mendoza Sullivan MD  3/1/2020  3:44 PM     no

## 2022-04-11 NOTE — H&P PST ADULT - PATIENT ON (OXYGEN DELIVERY METHOD)
RECORDS STATUS - ALL OTHER DIAGNOSIS      RECORDS RECEIVED FROM: [facility]   DATE RECEIVED: 9/29/2021   NOTES STATUS DETAILS   OFFICE NOTE from referring provider     OFFICE NOTE from medical oncologist Complete 8/13/2014 PE in [facility]    More in EPIC   DISCHARGE SUMMARY from hospital Complete Pre-Admit 10/6/2021    DISCHARGE REPORT from the ER     OPERATIVE REPORT N/A    MEDICATION LIST Complete [facility]   CLINICAL TRIAL TREATMENTS TO DATE     LABS     PATHOLOGY REPORTS     ANYTHING RELATED TO DIAGNOSIS Complete Labs last updated on 9/10/2021   GENONOMIC TESTING     TYPE:     IMAGING (NEED IMAGES & REPORT)     CT SCANS Complete CT Chest PE 9/10/2021   MRI     MAMMO     ULTRASOUND     PET       Action    Action Taken 9/28/2021 2:41pm CHRIS     I called pt Arya - all recs are internal          room air

## 2022-04-11 NOTE — H&P PST ADULT - NSICDXPASTSURGICALHX_GEN_ALL_CORE_FT
Patient refuses to reposition when offered throughout shift by writer. Pt refused to get up to chair thorughout day, stating he was \"cold and wanted to be under bearhugger in bed\". Pt does reposition himself in bed frequently and independently. Pt also refuses other cares such as central line dressing change and changing telemetry patches during washing. Pt refuses oral cares when offered. Patient offered meal, refused breakfast, was getting HD at lunch time, accepted meal at dinnertime and drank ensure supplement.    PAST SURGICAL HISTORY:  H/O:   and     History of lumpectomy of right breast 2005 benign    S/P bilateral breast reduction     S/P lumpectomy, left breast 2015

## 2022-04-11 NOTE — H&P PST ADULT - ASSESSMENT
Next Visit Date:  No future appointments.     Health Maintenance   Topic Date Due    HIV screen  09/24/1974    Diabetes screen  09/24/1999    Colon cancer screen colonoscopy  05/27/2020    Lipid screen  01/24/2022    DTaP/Tdap/Td vaccine (2 - Td) 06/04/2026    Flu vaccine  Completed    Hepatitis C screen  Completed       No results found for: LABA1C          ( goal A1C is < 7)   No results found for: LABMICR  LDL Cholesterol (mg/dL)   Date Value   01/24/2017 90       (goal LDL is <100)   AST (U/L)   Date Value   01/24/2017 29     ALT (U/L)   Date Value   01/24/2017 36     BUN (mg/dL)   Date Value   09/28/2017 16     BP Readings from Last 3 Encounters:   11/02/17 122/60   10/03/17 120/72   09/28/17 (!) 96/45          (goal 120/80)    All Future Testing planned in [facility]  Lab Frequency Next Occurrence   Lipid Panel Once 01/01/2018   AST Once 01/01/2018   ALT Once 01/01/2018   MRI Shoulder Left WO Contrast Once 11/02/2017               Patient Active Problem List:     Seizures ([address]     HIGH CHOLESTEROL     Lymphedema     Brain tumor ([address]     Amputee, above knee ([address]     Rectal bleeding     Transient alteration of awareness     Complex partial epilepsy without status epilepticus ([address]     Acquired hypothyroidism     Depression     Amnesia     Pure hypercholesterolemia     Acute encephalopathy
71 yo female presents with mass to the right thumb

## 2022-04-11 NOTE — H&P PST ADULT - VENOUS THROMBOEMBOLISM CURRENT STATUS
This note was copied from the mother's chart.     07/19/22 1100   Maternal Information   Date of Referral 07/19/22   Person Making Referral lactation consultant  (New mother/baby couplet)   Maternal Reason for Referral no prior breastfeeding experience   Maternal Assessment   Breast Size Issue none  (Breasts are large.)   Breast Shape Bilateral:;round   Breast Density Bilateral:;soft   Nipples Bilateral:;graspable   Left Nipple Symptoms other (see comments);tender  (Possible popped blister on tip of nipple.  Mom thinks baby wasn't latched well the first feeding.)   Right Nipple Symptoms intact;nontender   Maternal Infant Feeding   Maternal Emotional State relaxed;receptive   Infant Positioning clutch/football   Signs of Milk Transfer audible swallow;deep jaw excursions noted   Pain with Feeding no   Comfort Measures Before/During Feeding infant position adjusted;maternal position adjusted   Latch Assistance minimal assistance   Support Person Involvement verbally supports mother   Milk Expression/Equipment   Breast Pump Type double electric, personal  (Mom has a Motif pump at home.)   Baby latches and breast feeds very well.  He shows obvious feeding cues and is aggressive at the breast.  Mom was advised to feed every 3 hours and with feeding cues.  She was shown how to get baby latched deeply at the breast in the football hold.   (0) indicator not present

## 2022-04-11 NOTE — H&P PST ADULT - NSICDXPASTMEDICALHX_GEN_ALL_CORE_FT
PAST MEDICAL HISTORY:  Bilateral cataracts     Breast cancer, left treated with Radiation 2015    COVID-19 vaccine series completed     GERD (gastroesophageal reflux disease)     H/O osteopenia     HLD (hyperlipidemia)     HTN (hypertension)     Mass of right hand right thumb    TIA (transient ischemic attack) 2012, no residual deficits     PAST MEDICAL HISTORY:  Bilateral cataracts     Breast cancer, left treated with Radiation 2015    COVID-19 vaccine series completed     GERD (gastroesophageal reflux disease)     H/O low back pain     H/O osteopenia     HLD (hyperlipidemia)     HTN (hypertension)     Mass of right hand right thumb    Obesity (BMI 30.0-34.9)     TIA (transient ischemic attack) 2012, no residual deficits

## 2022-04-11 NOTE — H&P PST ADULT - NSANTHOSAYNRD_GEN_A_CORE
No. REBECCA screening performed.  STOP BANG Legend: 0-2 = LOW Risk; 3-4 = INTERMEDIATE Risk; 5-8 = HIGH Risk neck 14.5 inches/No. REBECCA screening performed.  STOP BANG Legend: 0-2 = LOW Risk; 3-4 = INTERMEDIATE Risk; 5-8 = HIGH Risk

## 2022-04-11 NOTE — H&P PST ADULT - NSICDXFAMILYHX_GEN_ALL_CORE_FT
FAMILY HISTORY:  Father  Still living? No  Family history of asthma, Age at diagnosis: Age Unknown    Mother  Still living? No  Family history of lung cancer, Age at diagnosis: Age Unknown  Family history of TIAs, Age at diagnosis: Age Unknown    Sibling  Still living? No  Family history of multiple myeloma, Age at diagnosis: Age Unknown  Family history of stomach cancer, Age at diagnosis: Age Unknown

## 2022-04-11 NOTE — H&P PST ADULT - HISTORY OF PRESENT ILLNESS
73 yo female presents with mass to the right thumb. She had an excision of a benign mass x3, 20 years ago without recurrence. Several months ago she began to have pain in the right thumb and the mass recurred. She denies any prior treatment and reports 6/10 pain but denies using any pain relief measures.

## 2022-04-11 NOTE — H&P PST ADULT - ADDITIONAL PE
patient distracted during the medical intake, repeatedly looking at her cell phone and answering her phone. she either did not answer questions or answered with something not relevant to the question.

## 2022-04-14 ENCOUNTER — NON-APPOINTMENT (OUTPATIENT)
Age: 72
End: 2022-04-14

## 2022-04-16 ENCOUNTER — OUTPATIENT (OUTPATIENT)
Dept: OUTPATIENT SERVICES | Facility: HOSPITAL | Age: 72
LOS: 1 days | End: 2022-04-16
Payer: MEDICARE

## 2022-04-16 DIAGNOSIS — Z20.828 CONTACT WITH AND (SUSPECTED) EXPOSURE TO OTHER VIRAL COMMUNICABLE DISEASES: ICD-10-CM

## 2022-04-16 DIAGNOSIS — Z01.818 ENCOUNTER FOR OTHER PREPROCEDURAL EXAMINATION: ICD-10-CM

## 2022-04-16 DIAGNOSIS — Z98.890 OTHER SPECIFIED POSTPROCEDURAL STATES: Chronic | ICD-10-CM

## 2022-04-16 DIAGNOSIS — Z98.891 HISTORY OF UTERINE SCAR FROM PREVIOUS SURGERY: Chronic | ICD-10-CM

## 2022-04-16 DIAGNOSIS — D21.11 BENIGN NEOPLASM OF CONNECTIVE AND OTHER SOFT TISSUE OF RIGHT UPPER LIMB, INCLUDING SHOULDER: ICD-10-CM

## 2022-04-16 PROCEDURE — U0003: CPT

## 2022-04-16 PROCEDURE — U0005: CPT

## 2022-04-17 LAB — SARS-COV-2 RNA SPEC QL NAA+PROBE: SIGNIFICANT CHANGE UP

## 2022-04-18 ENCOUNTER — TRANSCRIPTION ENCOUNTER (OUTPATIENT)
Age: 72
End: 2022-04-18

## 2022-04-18 NOTE — ASU PATIENT PROFILE, ADULT - FALL HARM RISK - UNIVERSAL INTERVENTIONS
Bed in lowest position, wheels locked, appropriate side rails in place/Instruct patient to call for assistance before getting out of bed or chair/Non-slip footwear when patient is out of bed/[place] to call system/Physically safe environment - no spills, clutter or unnecessary equipment/Purposeful Proactive Rounding/Room/bathroom lighting operational, light cord in reach

## 2022-04-18 NOTE — ASU PATIENT PROFILE, ADULT - NSICDXPASTSURGICALHX_GEN_ALL_CORE_FT
PAST SURGICAL HISTORY:  H/O:   and     History of lumpectomy of right breast 2005 benign    S/P bilateral breast reduction     S/P lumpectomy, left breast 2015

## 2022-04-18 NOTE — ASU PATIENT PROFILE, ADULT - NSICDXPASTMEDICALHX_GEN_ALL_CORE_FT
PAST MEDICAL HISTORY:  Bilateral cataracts     Breast cancer, left treated with Radiation 2015    COVID-19 vaccine series completed     GERD (gastroesophageal reflux disease)     H/O low back pain     H/O osteopenia     HLD (hyperlipidemia)     HTN (hypertension)     Mass of right hand right thumb    Obesity (BMI 30.0-34.9)     TIA (transient ischemic attack) 2012, no residual deficits

## 2022-04-19 ENCOUNTER — OUTPATIENT (OUTPATIENT)
Dept: OUTPATIENT SERVICES | Facility: HOSPITAL | Age: 72
LOS: 1 days | End: 2022-04-19
Payer: MEDICARE

## 2022-04-19 ENCOUNTER — APPOINTMENT (OUTPATIENT)
Dept: ORTHOPEDIC SURGERY | Facility: HOSPITAL | Age: 72
End: 2022-04-19

## 2022-04-19 ENCOUNTER — TRANSCRIPTION ENCOUNTER (OUTPATIENT)
Age: 72
End: 2022-04-19

## 2022-04-19 ENCOUNTER — RESULT REVIEW (OUTPATIENT)
Age: 72
End: 2022-04-19

## 2022-04-19 VITALS
DIASTOLIC BLOOD PRESSURE: 72 MMHG | RESPIRATION RATE: 13 BRPM | TEMPERATURE: 98 F | OXYGEN SATURATION: 94 % | SYSTOLIC BLOOD PRESSURE: 130 MMHG | HEART RATE: 61 BPM

## 2022-04-19 VITALS
HEIGHT: 60 IN | RESPIRATION RATE: 16 BRPM | SYSTOLIC BLOOD PRESSURE: 135 MMHG | DIASTOLIC BLOOD PRESSURE: 59 MMHG | WEIGHT: 163.14 LBS | OXYGEN SATURATION: 100 % | HEART RATE: 55 BPM | TEMPERATURE: 98 F

## 2022-04-19 DIAGNOSIS — Z98.890 OTHER SPECIFIED POSTPROCEDURAL STATES: Chronic | ICD-10-CM

## 2022-04-19 DIAGNOSIS — Z01.818 ENCOUNTER FOR OTHER PREPROCEDURAL EXAMINATION: ICD-10-CM

## 2022-04-19 DIAGNOSIS — Z98.891 HISTORY OF UTERINE SCAR FROM PREVIOUS SURGERY: Chronic | ICD-10-CM

## 2022-04-19 DIAGNOSIS — D21.11 BENIGN NEOPLASM OF CONNECTIVE AND OTHER SOFT TISSUE OF RIGHT UPPER LIMB, INCLUDING SHOULDER: ICD-10-CM

## 2022-04-19 PROCEDURE — 26113 EXC HAND TUM DEEP 1.5 CM/>: CPT | Mod: RT

## 2022-04-19 PROCEDURE — 26113 EXC HAND TUM DEEP 1.5 CM/>: CPT | Mod: F5

## 2022-04-19 PROCEDURE — 88305 TISSUE EXAM BY PATHOLOGIST: CPT | Mod: 26

## 2022-04-19 PROCEDURE — 88305 TISSUE EXAM BY PATHOLOGIST: CPT

## 2022-04-19 RX ORDER — TRIAMTERENE 100 MG/1
1 CAPSULE ORAL
Qty: 0 | Refills: 0 | DISCHARGE

## 2022-04-19 RX ORDER — APREPITANT 80 MG/1
40 CAPSULE ORAL ONCE
Refills: 0 | Status: COMPLETED | OUTPATIENT
Start: 2022-04-19 | End: 2022-04-19

## 2022-04-19 RX ORDER — OMEPRAZOLE 10 MG/1
1 CAPSULE, DELAYED RELEASE ORAL
Qty: 0 | Refills: 0 | DISCHARGE

## 2022-04-19 RX ORDER — ACETAMINOPHEN WITH CODEINE 300MG-30MG
1 TABLET ORAL
Qty: 5 | Refills: 0
Start: 2022-04-19

## 2022-04-19 RX ORDER — ZOLPIDEM TARTRATE 10 MG/1
1 TABLET ORAL
Qty: 0 | Refills: 0 | DISCHARGE

## 2022-04-19 RX ORDER — IBUPROFEN 200 MG
1 TABLET ORAL
Qty: 10 | Refills: 0
Start: 2022-04-19

## 2022-04-19 RX ORDER — CEFAZOLIN SODIUM 1 G
2000 VIAL (EA) INJECTION ONCE
Refills: 0 | Status: COMPLETED | OUTPATIENT
Start: 2022-04-19 | End: 2022-04-19

## 2022-04-19 RX ORDER — LANOLIN ALCOHOL/MO/W.PET/CERES
1 CREAM (GRAM) TOPICAL
Qty: 0 | Refills: 0 | DISCHARGE

## 2022-04-19 RX ORDER — CHLORHEXIDINE GLUCONATE 213 G/1000ML
1 SOLUTION TOPICAL ONCE
Refills: 0 | Status: COMPLETED | OUTPATIENT
Start: 2022-04-19 | End: 2022-04-19

## 2022-04-19 RX ORDER — AMLODIPINE BESYLATE 2.5 MG/1
1 TABLET ORAL
Qty: 0 | Refills: 0 | DISCHARGE

## 2022-04-19 RX ADMIN — CHLORHEXIDINE GLUCONATE 1 APPLICATION(S): 213 SOLUTION TOPICAL at 10:55

## 2022-04-19 RX ADMIN — APREPITANT 40 MILLIGRAM(S): 80 CAPSULE ORAL at 10:55

## 2022-04-19 NOTE — ASU PREOP CHECKLIST - SITE MARKED BY ANESTHESIOLOGIST
n/a Additional Notes: — Patient declines history of gastrointestinal problems. No joint pain or history of arthritis. Detail Level: Simple

## 2022-04-19 NOTE — ASU DISCHARGE PLAN (ADULT/PEDIATRIC) - NS MD DC FALL RISK RISK
For information on Fall & Injury Prevention, visit: [email or web address] OR  [email or web address] OR  https://www.cdc.gov/steadi/patient.html

## 2022-04-19 NOTE — ASU DISCHARGE PLAN (ADULT/PEDIATRIC) - ASU DC SPECIAL INSTRUCTIONSFT
- Call your doctor if you experience:  • An increase in pain not controlled by pain medication or change in activity or  position.  • Temperature greater than 101° F.  • Redness, increased swelling or foul smelling drainage from or around the  incision.  • Numbness, tingling or a change in color or temperature of the operative extremity.  • Call your doctor immediately if you experience chest pain, shortness of breath or calf pain.     Follow all verbal and written instructions. Take medications as prescribed. DO NOT drive, operate machinery, and/or make important decisions while on prescription pain medication. DO NOT hesitate to call Doctor's office with questions or concerns.

## 2022-04-19 NOTE — ASU DISCHARGE PLAN (ADULT/PEDIATRIC) - CARE PROVIDER_API CALL
Yes
Filiberto Bell)  Orthopaedic Surgery; Surgery of the Hand  [address], [facility] [address]  [place] [address]  Phone: [phone]  Fax: [phone]  Follow Up Time:

## 2022-04-20 PROBLEM — H26.9 UNSPECIFIED CATARACT: Chronic | Status: ACTIVE | Noted: 2022-04-11

## 2022-04-20 PROBLEM — E66.9 OBESITY, UNSPECIFIED: Chronic | Status: ACTIVE | Noted: 2022-04-11

## 2022-04-20 PROBLEM — R22.31 LOCALIZED SWELLING, MASS AND LUMP, RIGHT UPPER LIMB: Chronic | Status: ACTIVE | Noted: 2022-04-11

## 2022-04-20 PROBLEM — G45.9 TRANSIENT CEREBRAL ISCHEMIC ATTACK, UNSPECIFIED: Chronic | Status: ACTIVE | Noted: 2020-09-25

## 2022-04-20 PROBLEM — Z87.39 PERSONAL HISTORY OF OTHER DISEASES OF THE MUSCULOSKELETAL SYSTEM AND CONNECTIVE TISSUE: Chronic | Status: ACTIVE | Noted: 2022-04-11

## 2022-04-20 PROBLEM — Z92.29 PERSONAL HISTORY OF OTHER DRUG THERAPY: Chronic | Status: ACTIVE | Noted: 2022-04-11

## 2022-04-29 ENCOUNTER — APPOINTMENT (OUTPATIENT)
Dept: ORTHOPEDIC SURGERY | Facility: CLINIC | Age: 72
End: 2022-04-29
Payer: MEDICARE

## 2022-04-29 ENCOUNTER — NON-APPOINTMENT (OUTPATIENT)
Age: 72
End: 2022-04-29

## 2022-04-29 PROCEDURE — 99024 POSTOP FOLLOW-UP VISIT: CPT

## 2022-04-29 NOTE — ADDENDUM
[FreeTextEntry1] : I, Deena High, acted solely as a scribe for Dr. Bell on this date on 04/29/2022.

## 2022-04-29 NOTE — HISTORY OF PRESENT ILLNESS
[de-identified] : 10 days postoperative.  [de-identified] : 10 days status post excision of recurrent right thumb tumor.  Date of surgery: 4/19/2022.\par \par Pathology demonstrated: An epidermoid inclusion cyst.\par \par She is doing well and denies fever or chills at this time. She has minimal pain and rates her pain to be a 1 out of 10.  [de-identified] : Stable, 10 days postoperative. [de-identified] : Examination of her right thumb demonstrates her incision to be clean and dry.  There is no drainage or evidence of infection.  There is mild swelling.  She has intact sensation to light touch along the radial and ulnar digital nerves distally. [de-identified] : The sutures were removed and Steri-Strips were applied.  She was instructed on local wound care, gentle range of motion exercises and when to begin scar massage and desensitization.  She will follow-up in 2 weeks.

## 2022-04-29 NOTE — END OF VISIT
[FreeTextEntry3] : This note was written by Deena High on 04/29/2022 acting solely as a scribe for Dr. Filiberto Bell.\par  \par All medical record entries made by the Scribe were at my, Dr. Filiberto Bell, direction and personally dictated by me on 04/29/2022. I have personally reviewed the chart and agree that the record accurately reflects my personal performance of the history, physical exam, assessment and plan.

## 2022-05-05 ENCOUNTER — NON-APPOINTMENT (OUTPATIENT)
Age: 72
End: 2022-05-05

## 2022-05-13 ENCOUNTER — APPOINTMENT (OUTPATIENT)
Dept: ORTHOPEDIC SURGERY | Facility: CLINIC | Age: 72
End: 2022-05-13
Payer: MEDICARE

## 2022-05-13 PROCEDURE — 73140 X-RAY EXAM OF FINGER(S): CPT | Mod: F5

## 2022-05-13 PROCEDURE — 99024 POSTOP FOLLOW-UP VISIT: CPT

## 2022-05-13 RX ORDER — MELOXICAM 7.5 MG/1
7.5 TABLET ORAL
Qty: 20 | Refills: 0 | Status: ACTIVE | COMMUNITY
Start: 2022-05-13 | End: 1900-01-01

## 2022-05-13 NOTE — ADDENDUM
[FreeTextEntry1] : I, Deena High, acted solely as a scribe for Dr. Bell on this date on 05/13/2022.

## 2022-05-13 NOTE — END OF VISIT
[FreeTextEntry3] : This note was written by Deena High on 05/13/2022 acting solely as a scribe for Dr. Filiberto Bell.\par  \par All medical record entries made by the Scribe were at my, Dr. Filiberto Bell, direction and personally dictated by me on 05/13/2022. I have personally reviewed the chart and agree that the record accurately reflects my personal performance of the history, physical exam, assessment and plan.

## 2022-05-13 NOTE — HISTORY OF PRESENT ILLNESS
[de-identified] : 24 days postoperative.  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  Date of surgery: 4/19/2022.\par \par Pathology demonstrated: An epidermoid inclusion cyst.\par \par She has increased pain to the thumb ulnarly since her last office visit. Her pain is constant each day. She takes Tylenol for pain. She states that the pain is as severe as it was prior to the surgery. She rates her pain as an 8 out of 10 at this time.  [de-identified] : Examination of her right thumb demonstrates her incision to be healing well.  There is decreased swelling.  She is tender along the ulnar aspect of the finger at the distal phalanx, where the mass was resected.  There are no signs of an infection.  She has intact sensation to light touch along the radial and ulnar digital nerves distally. [de-identified] : AP, lateral, and oblique radiographs of the right thumb demonstrate similar findings comparative to preoperative with a deformity at the ulnar aspect of the distal phalanx consistent with chronic erosion from the [de-identified] : 24 days postoperative, with complaints of increased pain.  There are no concerning findings on examination. [de-identified] : I did tell her that I am surprised that she is feeling more pain and I have reassured her that I see no concerning findings on examination.  She told me that she thinks that she needs further surgery.  I did explain to her that there is no indication for further surgery.  She has a tumor that had been resected 3 times before 20 years ago and the surgery did require extensive dissection.  I am not surprised that she does have some pain but I am surprised that it is worsening.\par \par At this time, she was instructed on very gentle range of motion exercises and scar massage and desensitization.  She was prescribed meloxicam 7.5 mg a day.  She can also take Tylenol.  I recommended follow-up in 2 weeks.  If she notices increased pain swelling or other concerning findings, then she was instructed to immediately call the office.

## 2022-05-19 ENCOUNTER — NON-APPOINTMENT (OUTPATIENT)
Age: 72
End: 2022-05-19

## 2022-05-27 ENCOUNTER — APPOINTMENT (OUTPATIENT)
Dept: ORTHOPEDIC SURGERY | Facility: CLINIC | Age: 72
End: 2022-05-27
Payer: MEDICARE

## 2022-06-01 ENCOUNTER — APPOINTMENT (OUTPATIENT)
Dept: ORTHOPEDIC SURGERY | Facility: CLINIC | Age: 72
End: 2022-06-01
Payer: MEDICARE

## 2022-06-01 PROBLEM — D21.11 BENIGN NEOPLASM OF SOFT TISSUES OF UPPER LIMB, RIGHT: Status: ACTIVE | Noted: 2022-04-06

## 2022-06-08 ENCOUNTER — APPOINTMENT (OUTPATIENT)
Dept: ORTHOPEDIC SURGERY | Facility: CLINIC | Age: 72
End: 2022-06-08
Payer: MEDICARE

## 2022-06-08 DIAGNOSIS — D21.11 BENIGN NEOPLASM OF CONNECTIVE AND OTHER SOFT TISSUE OF RIGHT UPPER LIMB, INCLUDING SHOULDER: ICD-10-CM

## 2022-06-08 PROCEDURE — 99024 POSTOP FOLLOW-UP VISIT: CPT

## 2022-06-08 NOTE — HISTORY OF PRESENT ILLNESS
[de-identified] : 50 days postoperative.  [de-identified] : 50 days status post excision of recurrent right thumb tumor.  Date of surgery: 4/19/2022.\par \par Pathology demonstrated: An epidermoid inclusion cyst.\par \par See note from when she was seen in the office 26 days ago.  She was having complaints of increased pain and she was prescribed meloxicam 7.5 mg a day.\par \par She is feeling better and reports relief from the meloxicam. She does state she solely has residual symptoms with changes in weather, most notably when it is cold.  [de-identified] : Examination of her right thumb demonstrates her incision to be healing well.  There is decreased swelling.  There is minimal residual tenderness along the incision, but this is much improved.  There is residual deformity of the nail ulnarly compared to preoperative.  She has intact sensation to light touch along the radial and ulnar digital nerves distally. [de-identified] : Recent AP, lateral, and oblique radiographs of the right thumb demonstrate similar findings comparative to preoperative with a deformity at the ulnar aspect of the distal phalanx consistent with chronic erosion from the [de-identified] : Stable, 50 days postoperative, with decreased pain. [de-identified] : She is doing well.  At this time, she may advance her activities as tolerated. If she develops an increase in pain and associated symptoms, she was instructed to return to the office. She will follow up as needed, according to her symptoms.

## 2022-06-08 NOTE — ADDENDUM
[FreeTextEntry1] : I, Deena High, acted solely as a scribe for Dr. Bell on this date on 06/08/2022.

## 2022-06-08 NOTE — END OF VISIT
[FreeTextEntry3] : This note was written by Deena High on 06/08/2022 acting solely as a scribe for Dr. Filiberto Bell.\par  \par All medical record entries made by the Scribe were at my, Dr. Filiberto Bell, direction and personally dictated by me on 06/08/2022. I have personally reviewed the chart and agree that the record accurately reflects my personal performance of the history, physical exam, assessment and plan.

## 2022-06-23 ENCOUNTER — APPOINTMENT (OUTPATIENT)
Dept: ORTHOPEDIC SURGERY | Facility: CLINIC | Age: 72
End: 2022-06-23

## 2022-06-23 VITALS — HEIGHT: 60 IN | BODY MASS INDEX: 32.59 KG/M2 | WEIGHT: 166 LBS

## 2022-06-23 DIAGNOSIS — G89.29 LOW BACK PAIN, UNSPECIFIED: ICD-10-CM

## 2022-06-23 DIAGNOSIS — M54.50 LOW BACK PAIN, UNSPECIFIED: ICD-10-CM

## 2022-06-23 DIAGNOSIS — Z96.641 PRESENCE OF RIGHT ARTIFICIAL HIP JOINT: ICD-10-CM

## 2022-06-23 PROCEDURE — 99214 OFFICE O/P EST MOD 30 MIN: CPT | Mod: 24

## 2022-06-23 PROCEDURE — 73502 X-RAY EXAM HIP UNI 2-3 VIEWS: CPT

## 2022-06-23 RX ORDER — MELOXICAM 15 MG/1
15 TABLET ORAL
Qty: 90 | Refills: 0 | Status: ACTIVE | COMMUNITY
Start: 2022-06-23 | End: 1900-01-01

## 2022-06-25 NOTE — PHYSICAL EXAM
[de-identified] : Patient is well nourished, well-developed, in no acute distress, with appropriate mood and affect. The patient is oriented to time, place, and person. Respirations are even and unlabored. Gait evaluation does not reveal a limp. There is no inguinal adenopathy. Examination of the contralateral hip shows normal range of motion, strength, no tenderness, and intact skin. The affected limb is well-perfused and showed 2+ dp/pt pulses, without skin lesions, shows a grossly normal motor and sensory examination. Examination of the hip shows a well healed surgical scar. Hip motion is full and painless from 0-90 degrees extension to flexion, 20 degrees adduction and 20 degrees abduction, and 15 degrees internal and 30 degrees external rotation. Leg lengths are approximately equal. Both hips are stable and muscle strength is normal with good strength with resisted abduction and adduction. Pedal pulses are palpable. [de-identified] : AP pelvis, AP hip, and lateral x-rays of the [Side] hip were ordered and obtained in the office and demonstrate satisfactory position and alignment of the components are present. No signs of loosening are seen.

## 2022-06-25 NOTE — DISCUSSION/SUMMARY
[de-identified] : Doing well almost 2 years status post right total of arthroplasty.  No evidence of right total hip arthroplasty failure.  Continue to weight-bear as tolerated.  She never follow-up with physiatry regarding her radiculopathy that I recognize 2 years ago.An extensive discussion was conducted on the natural history of the disease and the variety of surgical and non-surgical options available to the patient including, but not limited to non-steroidal anti-inflammatory medications, steroid injections, physical therapy, maintenance of ideal body weight, and reduction of activity.  Physical therapy recommended and I again referred her to physiatry.  Mobic prescribed.  Physical therapy prescribed.  Follow-up with me regarding her right hip in 2 years.\par

## 2022-06-25 NOTE — HISTORY OF PRESENT ILLNESS
[de-identified] : This is very nice 72-year-old female here for interim evaluation of right total hip arthroplasty. The patient reports good pain relief since surgery in the replaced joint and satisfactory restoration of function in terms of activities of daily living.  She has chronic weakness in her right thigh and low back pain with some numbness and tingling in her feet.  No groin pain.  No fevers or chills.  She does not use a cane or walker for ambulation.  No bowel or bladder incontinence.\par

## 2022-07-25 NOTE — H&P PST ADULT - LIVING CHILDREN, OB PROFILE
2 Siliq Counseling:  I discussed with the patient the risks of Siliq including but not limited to new or worsening depression, suicidal thoughts and behavior, immunosuppression, malignancy, posterior leukoencephalopathy syndrome, and serious infections.  The patient understands that monitoring is required including a PPD at baseline and must alert us or the primary physician if symptoms of infection or other concerning signs are noted. There is also a special program designed to monitor depression which is required with Siliq.

## 2022-09-09 RX ORDER — AMOXICILLIN 500 MG/1
500 TABLET, FILM COATED ORAL
Qty: 4 | Refills: 2 | Status: ACTIVE | COMMUNITY
Start: 2022-09-09 | End: 1900-01-01

## 2022-10-13 NOTE — ASU PREOP CHECKLIST - HEIGHT IN INCHES
0 Rinvoq Counseling: I discussed with the patient the risks of Rinvoq therapy including but not limited to upper respiratory tract infections, shingles, cold sores, bronchitis, nausea, cough, fever, acne, and headache. Live vaccines should be avoided.  This medication has been linked to serious infections; higher rate of mortality; malignancy and lymphoproliferative disorders; major adverse cardiovascular events; thrombosis; thrombocytopenia, anemia, and neutropenia; lipid elevations; liver enzyme elevations; and gastrointestinal perforations.

## 2023-02-14 ENCOUNTER — APPOINTMENT (OUTPATIENT)
Dept: PULMONOLOGY | Facility: CLINIC | Age: 73
End: 2023-02-14

## 2023-09-29 NOTE — ASU PREOP CHECKLIST - HIBICLENS SHOWER 1 DATE
Airway    Performed by: Patti Villatoro DO  Authorized by: Patti Villatoro DO    Final Airway Type:  Endotracheal airway  Final Endotracheal Airway*:  ETT  ETT Size (mm)*:  7.5  Cuff*:  Regular  Technique Used for Successful ETT Placement:  Video laryngoscopy  Devices/Methods Used in Placement*:  Mask  Intubation Procedure*:  ETCO2, Preoxygenation, Atraumatic, Dentition Unchanged, Pharynx Clear, Cricoid Pressure and Rapid Sequence Intubation  Insertion Site:  Oral  Blade Type*:  Video Laryngoscope  Blade Size*:  4  Secured at (cm)*:  22  Placement Verified by: auscultation, capnometry and palpation of cuff    Glottic View*:  1 - full view of glottis  Attempts*:  1   Patient Identified, Procedure confirmed, Emergency equipment available and Safety protocols followed  Location:  ED  Urgency:  Emergent  Difficult Airway: No    Indications for Airway Management:  Cardiovascular instability  Spontaneous Ventilation: present    Sedation Level:  Deep  Mask Difficulty Assessment:  0 - not attempted  Verbal Consent Obtained: Yes    Consent Given By:  Patient and spouse  Risks and Benefits were Discussed: Yes    Patient Identity Confirmed by:  Verbally with patient and arm band   Atraumatic, 1 pass, no resistance. Dentition unchanged. No aspiration noted.  CRICOID THROUGHOUT UNTIL etc02 confirmed chest rise color change      
19-Apr-2022 00:00

## 2024-02-09 ENCOUNTER — NON-APPOINTMENT (OUTPATIENT)
Age: 74
End: 2024-02-09

## 2025-03-17 ENCOUNTER — APPOINTMENT (OUTPATIENT)
Dept: GASTROENTEROLOGY | Facility: CLINIC | Age: 75
End: 2025-03-17
Payer: MEDICARE

## 2025-03-17 VITALS
SYSTOLIC BLOOD PRESSURE: 146 MMHG | DIASTOLIC BLOOD PRESSURE: 72 MMHG | HEIGHT: 60 IN | HEART RATE: 70 BPM | OXYGEN SATURATION: 98 % | BODY MASS INDEX: 33.38 KG/M2 | WEIGHT: 170 LBS

## 2025-03-17 DIAGNOSIS — K92.1 MELENA: ICD-10-CM

## 2025-03-17 DIAGNOSIS — K44.9 DIAPHRAGMATIC HERNIA W/OUT OBSTRUCTION OR GANGRENE: ICD-10-CM

## 2025-03-17 DIAGNOSIS — R10.32 LEFT LOWER QUADRANT PAIN: ICD-10-CM

## 2025-03-17 DIAGNOSIS — R10.13 EPIGASTRIC PAIN: ICD-10-CM

## 2025-03-17 DIAGNOSIS — Z12.11 ENCOUNTER FOR SCREENING FOR MALIGNANT NEOPLASM OF COLON: ICD-10-CM

## 2025-03-17 DIAGNOSIS — K52.9 NONINFECTIVE GASTROENTERITIS AND COLITIS, UNSPECIFIED: ICD-10-CM

## 2025-03-17 PROCEDURE — 99214 OFFICE O/P EST MOD 30 MIN: CPT

## 2025-03-17 RX ORDER — SODIUM SULFATE, POTASSIUM SULFATE AND MAGNESIUM SULFATE 1.6; 3.13; 17.5 G/177ML; G/177ML; G/177ML
17.5-3.13-1.6 SOLUTION ORAL
Qty: 177 | Refills: 0 | Status: ACTIVE | COMMUNITY
Start: 2025-03-17 | End: 1900-01-01

## 2025-04-01 ENCOUNTER — RESULT REVIEW (OUTPATIENT)
Age: 75
End: 2025-04-01

## 2025-04-01 ENCOUNTER — APPOINTMENT (OUTPATIENT)
Dept: GASTROENTEROLOGY | Facility: GI CENTER | Age: 75
End: 2025-04-01
Payer: MEDICARE

## 2025-04-01 PROCEDURE — 43239 EGD BIOPSY SINGLE/MULTIPLE: CPT

## 2025-04-01 PROCEDURE — 45380 COLONOSCOPY AND BIOPSY: CPT

## 2025-04-17 ENCOUNTER — APPOINTMENT (OUTPATIENT)
Dept: GASTROENTEROLOGY | Facility: CLINIC | Age: 75
End: 2025-04-17

## (undated) DEVICE — DRSG WEBRIL 3"

## (undated) DEVICE — DRSG ESMARK 4"

## (undated) DEVICE — GLV 7.5 ESTEEM BLUE

## (undated) DEVICE — GLV 7 PROTEXIS

## (undated) DEVICE — SUT MONOSOF 5-0 18" P-13

## (undated) DEVICE — CUFF TOURNIQUET 18" DUAL PORT W PLC

## (undated) DEVICE — DRAPE 3/4 SHEET 52X76"

## (undated) DEVICE — DRSG ACE BANDAGE 4"

## (undated) DEVICE — WRAP COMPRESSION CALF MED

## (undated) DEVICE — CONTAINER SPECIMEN PET

## (undated) DEVICE — POSITIONER STRAP ARMBOARD VELCRO TS-30 12

## (undated) DEVICE — DRAPE SURGICAL #1010

## (undated) DEVICE — DRAPE TOWEL BLUE 17" X 24"

## (undated) DEVICE — PACK UPPER EXTREMITY

## (undated) DEVICE — BLANKET WARMER LOWER ADULT

## (undated) DEVICE — POSITIONER FOAM HEAD CRADLE

## (undated) DEVICE — NDL HYPO REGULAR BEVEL 25G X 1.5"